# Patient Record
Sex: MALE | Race: WHITE | NOT HISPANIC OR LATINO | Employment: OTHER | URBAN - METROPOLITAN AREA
[De-identification: names, ages, dates, MRNs, and addresses within clinical notes are randomized per-mention and may not be internally consistent; named-entity substitution may affect disease eponyms.]

---

## 2023-06-11 ENCOUNTER — HOSPITAL ENCOUNTER (INPATIENT)
Facility: HOSPITAL | Age: 58
LOS: 3 days | Discharge: OTHER INSTITUTION | End: 2023-06-14
Attending: EMERGENCY MEDICINE | Admitting: EMERGENCY MEDICINE
Payer: COMMERCIAL

## 2023-06-11 ENCOUNTER — HOSPITAL ENCOUNTER (EMERGENCY)
Facility: HOSPITAL | Age: 58
End: 2023-06-11
Attending: EMERGENCY MEDICINE

## 2023-06-11 VITALS
OXYGEN SATURATION: 99 % | RESPIRATION RATE: 16 BRPM | SYSTOLIC BLOOD PRESSURE: 164 MMHG | DIASTOLIC BLOOD PRESSURE: 98 MMHG | TEMPERATURE: 98.4 F | HEART RATE: 105 BPM | WEIGHT: 160 LBS

## 2023-06-11 DIAGNOSIS — F10.20 ALCOHOL USE DISORDER, SEVERE, DEPENDENCE (HCC): ICD-10-CM

## 2023-06-11 DIAGNOSIS — F10.10 ALCOHOL ABUSE: Primary | ICD-10-CM

## 2023-06-11 PROBLEM — F10.939 ALCOHOL WITHDRAWAL SYNDROME WITH COMPLICATION (HCC): Status: ACTIVE | Noted: 2023-06-11

## 2023-06-11 PROBLEM — D75.89 MACROCYTOSIS WITHOUT ANEMIA: Status: ACTIVE | Noted: 2023-06-11

## 2023-06-11 LAB
ALBUMIN SERPL BCP-MCNC: 3.7 G/DL (ref 3.5–5)
ALP SERPL-CCNC: 104 U/L (ref 34–104)
ALT SERPL W P-5'-P-CCNC: 30 U/L (ref 7–52)
ANION GAP SERPL CALCULATED.3IONS-SCNC: 13 MMOL/L (ref 4–13)
APTT PPP: 28 SECONDS (ref 23–37)
AST SERPL W P-5'-P-CCNC: 53 U/L (ref 13–39)
BASE EXCESS BLDA CALC-SCNC: 1 MMOL/L (ref -2–3)
BASOPHILS # BLD AUTO: 0.03 THOUSANDS/ÂΜL (ref 0–0.1)
BASOPHILS NFR BLD AUTO: 1 % (ref 0–1)
BILIRUB SERPL-MCNC: 0.94 MG/DL (ref 0.2–1)
BUN SERPL-MCNC: 5 MG/DL (ref 5–25)
CA-I BLD-SCNC: 1.07 MMOL/L (ref 1.12–1.32)
CALCIUM SERPL-MCNC: 9.3 MG/DL (ref 8.4–10.2)
CHLORIDE SERPL-SCNC: 98 MMOL/L (ref 96–108)
CO2 SERPL-SCNC: 25 MMOL/L (ref 21–32)
CREAT SERPL-MCNC: 0.79 MG/DL (ref 0.6–1.3)
EOSINOPHIL # BLD AUTO: 0.05 THOUSAND/ÂΜL (ref 0–0.61)
EOSINOPHIL NFR BLD AUTO: 1 % (ref 0–6)
ERYTHROCYTE [DISTWIDTH] IN BLOOD BY AUTOMATED COUNT: 11.2 % (ref 11.6–15.1)
ETHANOL SERPL-MCNC: 198 MG/DL
GFR SERPL CREATININE-BSD FRML MDRD: 99 ML/MIN/1.73SQ M
GLUCOSE SERPL-MCNC: 100 MG/DL (ref 65–140)
GLUCOSE SERPL-MCNC: 97 MG/DL (ref 65–140)
HCO3 BLDA-SCNC: 24.7 MMOL/L (ref 24–30)
HCT VFR BLD AUTO: 43.5 % (ref 36.5–49.3)
HCT VFR BLD CALC: 43 % (ref 36.5–49.3)
HGB BLD-MCNC: 14.8 G/DL (ref 12–17)
HGB BLDA-MCNC: 14.6 G/DL (ref 12–17)
IMM GRANULOCYTES # BLD AUTO: 0.01 THOUSAND/UL (ref 0–0.2)
IMM GRANULOCYTES NFR BLD AUTO: 0 % (ref 0–2)
INR PPP: 0.94 (ref 0.84–1.19)
LYMPHOCYTES # BLD AUTO: 1.35 THOUSANDS/ÂΜL (ref 0.6–4.47)
LYMPHOCYTES NFR BLD AUTO: 25 % (ref 14–44)
MCH RBC QN AUTO: 33.9 PG (ref 26.8–34.3)
MCHC RBC AUTO-ENTMCNC: 34 G/DL (ref 31.4–37.4)
MCV RBC AUTO: 100 FL (ref 82–98)
MONOCYTES # BLD AUTO: 0.57 THOUSAND/ÂΜL (ref 0.17–1.22)
MONOCYTES NFR BLD AUTO: 11 % (ref 4–12)
NEUTROPHILS # BLD AUTO: 3.42 THOUSANDS/ÂΜL (ref 1.85–7.62)
NEUTS SEG NFR BLD AUTO: 62 % (ref 43–75)
NRBC BLD AUTO-RTO: 0 /100 WBCS
PCO2 BLD: 26 MMOL/L (ref 21–32)
PCO2 BLD: 36.3 MM HG (ref 42–50)
PH BLD: 7.44 [PH] (ref 7.3–7.4)
PLATELET # BLD AUTO: 165 THOUSANDS/UL (ref 149–390)
PMV BLD AUTO: 8.7 FL (ref 8.9–12.7)
PO2 BLD: 45 MM HG (ref 35–45)
POTASSIUM BLD-SCNC: 3.8 MMOL/L (ref 3.5–5.3)
POTASSIUM SERPL-SCNC: 3.8 MMOL/L (ref 3.5–5.3)
PROT SERPL-MCNC: 7.6 G/DL (ref 6.4–8.4)
PROTHROMBIN TIME: 13.2 SECONDS (ref 11.6–14.5)
RBC # BLD AUTO: 4.36 MILLION/UL (ref 3.88–5.62)
SAO2 % BLD FROM PO2: 83 % (ref 60–85)
SODIUM BLD-SCNC: 136 MMOL/L (ref 136–145)
SODIUM SERPL-SCNC: 136 MMOL/L (ref 135–147)
SPECIMEN SOURCE: ABNORMAL
WBC # BLD AUTO: 5.43 THOUSAND/UL (ref 4.31–10.16)

## 2023-06-11 PROCEDURE — 84295 ASSAY OF SERUM SODIUM: CPT

## 2023-06-11 PROCEDURE — 85025 COMPLETE CBC W/AUTO DIFF WBC: CPT | Performed by: EMERGENCY MEDICINE

## 2023-06-11 PROCEDURE — 80053 COMPREHEN METABOLIC PANEL: CPT | Performed by: EMERGENCY MEDICINE

## 2023-06-11 PROCEDURE — 99285 EMERGENCY DEPT VISIT HI MDM: CPT

## 2023-06-11 PROCEDURE — 82947 ASSAY GLUCOSE BLOOD QUANT: CPT

## 2023-06-11 PROCEDURE — 85610 PROTHROMBIN TIME: CPT | Performed by: EMERGENCY MEDICINE

## 2023-06-11 PROCEDURE — 84132 ASSAY OF SERUM POTASSIUM: CPT

## 2023-06-11 PROCEDURE — 85730 THROMBOPLASTIN TIME PARTIAL: CPT | Performed by: EMERGENCY MEDICINE

## 2023-06-11 PROCEDURE — 96360 HYDRATION IV INFUSION INIT: CPT

## 2023-06-11 PROCEDURE — 82077 ASSAY SPEC XCP UR&BREATH IA: CPT | Performed by: EMERGENCY MEDICINE

## 2023-06-11 PROCEDURE — 82803 BLOOD GASES ANY COMBINATION: CPT

## 2023-06-11 PROCEDURE — HZ2ZZZZ DETOXIFICATION SERVICES FOR SUBSTANCE ABUSE TREATMENT: ICD-10-PCS | Performed by: EMERGENCY MEDICINE

## 2023-06-11 PROCEDURE — 36415 COLL VENOUS BLD VENIPUNCTURE: CPT | Performed by: EMERGENCY MEDICINE

## 2023-06-11 PROCEDURE — 96361 HYDRATE IV INFUSION ADD-ON: CPT

## 2023-06-11 PROCEDURE — 85014 HEMATOCRIT: CPT

## 2023-06-11 PROCEDURE — 82330 ASSAY OF CALCIUM: CPT

## 2023-06-11 RX ORDER — TRAZODONE HYDROCHLORIDE 50 MG/1
50 TABLET ORAL
Status: DISCONTINUED | OUTPATIENT
Start: 2023-06-11 | End: 2023-06-14 | Stop reason: HOSPADM

## 2023-06-11 RX ORDER — SODIUM CHLORIDE 9 MG/ML
75 INJECTION, SOLUTION INTRAVENOUS CONTINUOUS
Status: DISCONTINUED | OUTPATIENT
Start: 2023-06-11 | End: 2023-06-13

## 2023-06-11 RX ORDER — ACETAMINOPHEN 325 MG/1
650 TABLET ORAL EVERY 6 HOURS PRN
Status: CANCELLED | OUTPATIENT
Start: 2023-06-11

## 2023-06-11 RX ORDER — LANOLIN ALCOHOL/MO/W.PET/CERES
100 CREAM (GRAM) TOPICAL DAILY
Status: CANCELLED | OUTPATIENT
Start: 2023-06-12

## 2023-06-11 RX ORDER — ACETAMINOPHEN 325 MG/1
650 TABLET ORAL EVERY 6 HOURS PRN
Status: DISCONTINUED | OUTPATIENT
Start: 2023-06-11 | End: 2023-06-14 | Stop reason: HOSPADM

## 2023-06-11 RX ORDER — ENOXAPARIN SODIUM 100 MG/ML
40 INJECTION SUBCUTANEOUS DAILY
Status: CANCELLED | OUTPATIENT
Start: 2023-06-12

## 2023-06-11 RX ORDER — FOLIC ACID 1 MG/1
1 TABLET ORAL DAILY
Status: DISCONTINUED | OUTPATIENT
Start: 2023-06-12 | End: 2023-06-14 | Stop reason: HOSPADM

## 2023-06-11 RX ORDER — FOLIC ACID 1 MG/1
1 TABLET ORAL DAILY
Status: CANCELLED | OUTPATIENT
Start: 2023-06-12

## 2023-06-11 RX ORDER — LANOLIN ALCOHOL/MO/W.PET/CERES
100 CREAM (GRAM) TOPICAL DAILY
Status: DISCONTINUED | OUTPATIENT
Start: 2023-06-12 | End: 2023-06-14 | Stop reason: HOSPADM

## 2023-06-11 RX ORDER — ENOXAPARIN SODIUM 100 MG/ML
40 INJECTION SUBCUTANEOUS DAILY
Status: DISCONTINUED | OUTPATIENT
Start: 2023-06-12 | End: 2023-06-14 | Stop reason: HOSPADM

## 2023-06-11 RX ORDER — ONDANSETRON 2 MG/ML
4 INJECTION INTRAMUSCULAR; INTRAVENOUS EVERY 6 HOURS PRN
Status: CANCELLED | OUTPATIENT
Start: 2023-06-11

## 2023-06-11 RX ORDER — TRAZODONE HYDROCHLORIDE 50 MG/1
50 TABLET ORAL
Status: CANCELLED | OUTPATIENT
Start: 2023-06-11

## 2023-06-11 RX ORDER — ONDANSETRON 2 MG/ML
4 INJECTION INTRAMUSCULAR; INTRAVENOUS EVERY 6 HOURS PRN
Status: DISCONTINUED | OUTPATIENT
Start: 2023-06-11 | End: 2023-06-14 | Stop reason: HOSPADM

## 2023-06-11 RX ORDER — SODIUM CHLORIDE 9 MG/ML
75 INJECTION, SOLUTION INTRAVENOUS CONTINUOUS
Status: CANCELLED | OUTPATIENT
Start: 2023-06-11

## 2023-06-11 RX ADMIN — SODIUM CHLORIDE 1000 ML: 0.9 INJECTION, SOLUTION INTRAVENOUS at 19:15

## 2023-06-12 PROBLEM — G62.9 PERIPHERAL NEUROPATHY: Status: ACTIVE | Noted: 2023-06-12

## 2023-06-12 PROBLEM — H91.90 HEARING IMPAIRED: Status: ACTIVE | Noted: 2023-06-12

## 2023-06-12 LAB
ALBUMIN SERPL BCP-MCNC: 3.5 G/DL (ref 3.5–5)
ALP SERPL-CCNC: 94 U/L (ref 34–104)
ALT SERPL W P-5'-P-CCNC: 26 U/L (ref 7–52)
ANION GAP SERPL CALCULATED.3IONS-SCNC: 13 MMOL/L (ref 4–13)
AST SERPL W P-5'-P-CCNC: 44 U/L (ref 13–39)
ATRIAL RATE: 88 BPM
BILIRUB SERPL-MCNC: 1.06 MG/DL (ref 0.2–1)
BUN SERPL-MCNC: 5 MG/DL (ref 5–25)
CALCIUM SERPL-MCNC: 8.6 MG/DL (ref 8.4–10.2)
CHLORIDE SERPL-SCNC: 102 MMOL/L (ref 96–108)
CO2 SERPL-SCNC: 23 MMOL/L (ref 21–32)
CREAT SERPL-MCNC: 0.76 MG/DL (ref 0.6–1.3)
ERYTHROCYTE [DISTWIDTH] IN BLOOD BY AUTOMATED COUNT: 11.3 % (ref 11.6–15.1)
GFR SERPL CREATININE-BSD FRML MDRD: 101 ML/MIN/1.73SQ M
GLUCOSE SERPL-MCNC: 81 MG/DL (ref 65–140)
HCT VFR BLD AUTO: 40.6 % (ref 36.5–49.3)
HGB BLD-MCNC: 13.8 G/DL (ref 12–17)
MAGNESIUM SERPL-MCNC: 1.9 MG/DL (ref 1.9–2.7)
MCH RBC QN AUTO: 33.5 PG (ref 26.8–34.3)
MCHC RBC AUTO-ENTMCNC: 34 G/DL (ref 31.4–37.4)
MCV RBC AUTO: 99 FL (ref 82–98)
P AXIS: 44 DEGREES
PLATELET # BLD AUTO: 161 THOUSANDS/UL (ref 149–390)
PMV BLD AUTO: 8.8 FL (ref 8.9–12.7)
POTASSIUM SERPL-SCNC: 3.8 MMOL/L (ref 3.5–5.3)
PR INTERVAL: 204 MS
PROT SERPL-MCNC: 6.5 G/DL (ref 6.4–8.4)
QRS AXIS: 13 DEGREES
QRSD INTERVAL: 88 MS
QT INTERVAL: 394 MS
QTC INTERVAL: 476 MS
RBC # BLD AUTO: 4.12 MILLION/UL (ref 3.88–5.62)
SODIUM SERPL-SCNC: 138 MMOL/L (ref 135–147)
T WAVE AXIS: 43 DEGREES
VENTRICULAR RATE: 88 BPM
WBC # BLD AUTO: 5.75 THOUSAND/UL (ref 4.31–10.16)

## 2023-06-12 PROCEDURE — 99223 1ST HOSP IP/OBS HIGH 75: CPT

## 2023-06-12 PROCEDURE — 83735 ASSAY OF MAGNESIUM: CPT

## 2023-06-12 PROCEDURE — 85027 COMPLETE CBC AUTOMATED: CPT

## 2023-06-12 PROCEDURE — 80053 COMPREHEN METABOLIC PANEL: CPT

## 2023-06-12 PROCEDURE — 93005 ELECTROCARDIOGRAM TRACING: CPT

## 2023-06-12 PROCEDURE — 93010 ELECTROCARDIOGRAM REPORT: CPT | Performed by: INTERNAL MEDICINE

## 2023-06-12 RX ORDER — PHENOBARBITAL 64.8 MG/1
64.8 TABLET ORAL ONCE
Status: COMPLETED | OUTPATIENT
Start: 2023-06-12 | End: 2023-06-12

## 2023-06-12 RX ORDER — PHENOBARBITAL SODIUM 130 MG/ML
130 INJECTION INTRAMUSCULAR ONCE
Status: COMPLETED | OUTPATIENT
Start: 2023-06-12 | End: 2023-06-12

## 2023-06-12 RX ORDER — PHENOBARBITAL SODIUM 130 MG/ML
260 INJECTION INTRAMUSCULAR ONCE
Status: COMPLETED | OUTPATIENT
Start: 2023-06-12 | End: 2023-06-12

## 2023-06-12 RX ADMIN — PHENOBARBITAL SODIUM 130 MG: 130 INJECTION INTRAMUSCULAR at 07:28

## 2023-06-12 RX ADMIN — SODIUM CHLORIDE 75 ML/HR: 0.9 INJECTION, SOLUTION INTRAVENOUS at 00:51

## 2023-06-12 RX ADMIN — PHENOBARBITAL 64.8 MG: 64.8 TABLET ORAL at 08:31

## 2023-06-12 RX ADMIN — PHENOBARBITAL 64.8 MG: 64.8 TABLET ORAL at 11:44

## 2023-06-12 RX ADMIN — PHENOBARBITAL SODIUM 130 MG: 130 INJECTION INTRAMUSCULAR at 14:04

## 2023-06-12 RX ADMIN — ENOXAPARIN SODIUM 40 MG: 40 INJECTION SUBCUTANEOUS at 08:29

## 2023-06-12 RX ADMIN — SODIUM CHLORIDE 75 ML/HR: 0.9 INJECTION, SOLUTION INTRAVENOUS at 23:55

## 2023-06-12 RX ADMIN — MULTIPLE VITAMINS W/ MINERALS TAB 1 TABLET: TAB ORAL at 08:29

## 2023-06-12 RX ADMIN — PHENOBARBITAL SODIUM 260 MG: 130 INJECTION INTRAMUSCULAR at 06:10

## 2023-06-12 RX ADMIN — THIAMINE HCL TAB 100 MG 100 MG: 100 TAB at 08:29

## 2023-06-12 RX ADMIN — FOLIC ACID 1 MG: 1 TABLET ORAL at 08:29

## 2023-06-12 RX ADMIN — PHENOBARBITAL SODIUM 130 MG: 130 INJECTION INTRAMUSCULAR at 09:55

## 2023-06-12 NOTE — PLAN OF CARE
Problem: PAIN - ADULT  Goal: Verbalizes/displays adequate comfort level or baseline comfort level  Description: Interventions:  - Encourage patient to monitor pain and request assistance  - Assess pain using appropriate pain scale  - Administer analgesics based on type and severity of pain and evaluate response  - Implement non-pharmacological measures as appropriate and evaluate response  - Consider cultural and social influences on pain and pain management  - Notify physician/advanced practitioner if interventions unsuccessful or patient reports new pain  Outcome: Not Progressing     Problem: INFECTION - ADULT  Goal: Absence or prevention of progression during hospitalization  Description: INTERVENTIONS:  - Assess and monitor for signs and symptoms of infection  - Monitor lab/diagnostic results  - Monitor all insertion sites, i e  indwelling lines, tubes, and drains  - Monitor endotracheal if appropriate and nasal secretions for changes in amount and color  - Scotts appropriate cooling/warming therapies per order  - Administer medications as ordered  - Instruct and encourage patient and family to use good hand hygiene technique  - Identify and instruct in appropriate isolation precautions for identified infection/condition  Outcome: Not Progressing  Goal: Absence of fever/infection during neutropenic period  Description: INTERVENTIONS:  - Monitor WBC    Outcome: Not Progressing     Problem: SAFETY ADULT  Goal: Patient will remain free of falls  Description: INTERVENTIONS:  - Educate patient/family on patient safety including physical limitations  - Instruct patient to call for assistance with activity   - Consult OT/PT to assist with strengthening/mobility   - Keep Call bell within reach  - Keep bed low and locked with side rails adjusted as appropriate  - Keep care items and personal belongings within reach  - Initiate and maintain comfort rounds  - Make Fall Risk Sign visible to staff  - Apply yellow socks and bracelet for high fall risk patients  - Consider moving patient to room near nurses station  Outcome: Not Progressing  Goal: Maintain or return to baseline ADL function  Description: INTERVENTIONS:  -  Assess patient's ability to carry out ADLs; assess patient's baseline for ADL function and identify physical deficits which impact ability to perform ADLs (bathing, care of mouth/teeth, toileting, grooming, dressing, etc )  - Assess/evaluate cause of self-care deficits   - Assess range of motion  - Assess patient's mobility; develop plan if impaired  - Assess patient's need for assistive devices and provide as appropriate  - Encourage maximum independence but intervene and supervise when necessary  - Involve family in performance of ADLs  - Assess for home care needs following discharge   - Consider OT consult to assist with ADL evaluation and planning for discharge  - Provide patient education as appropriate  Outcome: Not Progressing  Goal: Maintains/Returns to pre admission functional level  Description: INTERVENTIONS:  - Perform BMAT or MOVE assessment daily    - Set and communicate daily mobility goal to care team and patient/family/caregiver     - Collaborate with rehabilitation services on mobility goals if consulted  - Out of bed to chair 1 times a day   - Out of bed for meals 1 times a day  - Out of bed for toileting  - Record patient progress and toleration of activity level   Outcome: Not Progressing     Problem: DISCHARGE PLANNING  Goal: Discharge to home or other facility with appropriate resources  Description: INTERVENTIONS:  - Identify barriers to discharge w/patient and caregiver  - Arrange for needed discharge resources and transportation as appropriate  - Identify discharge learning needs (meds, wound care, etc )  - Arrange for interpretive services to assist at discharge as needed  - Refer to Case Management Department for coordinating discharge planning if the patient needs post-hospital services based on physician/advanced practitioner order or complex needs related to functional status, cognitive ability, or social support system  Outcome: Not Progressing     Problem: Knowledge Deficit  Goal: Patient/family/caregiver demonstrates understanding of disease process, treatment plan, medications, and discharge instructions  Description: Complete learning assessment and assess knowledge base    Interventions:  - Provide teaching at level of understanding  - Provide teaching via preferred learning methods  Outcome: Not Progressing

## 2023-06-12 NOTE — DISCHARGE INSTR - OTHER ORDERS
[facility] DRUG AND ALCOHOL RESOURCES    If you have health insurance, including medical assistance, there should be a phone number on your insurance card that you can call to find out how to access services  The card may say, “For [address] or “For Drug and Alcohol Services” or “For Substance Abuse Services” call the number provided  Need immediate help? Call [place] Get Help Now at [phone] or [address] at [phone]     24 hour hotline The [address]  [phone]    HOW DO I ACCESS TREATMENT? In response to the current guidelines of coronavirus (COVID-19), there have been some changes as to how treatment and services are accessed  The first step in accessing treatment is to have a drug and alcohol assessment to determine the type of treatment and recovery plan needed  Many of the assessments will occur via Telehealth  Staff at the assessment site will determine if emergent care needs - detox, psychiatric, prenatal - are necessary  Jim Styles [address] [phone]  [address] [place] [address], [address] [facility] (on the grounds of [facility])  Sukhjinder Arevalo 10 [phone] or [phone]  [address] [place] [address]  [address] [place] [address] ([facility]  Centers of Excellence coordinate care for people with Medicaid  Treatment is team-based and focused on the [facility] person,” with the explicit goal of integrating behavioral health with primary care  This approach includes psychiatric support, nursing, clinical care and a Certified  (Pio  98 )   The team helps individuals navigate and connect to resources that address physical healthcare, early recovery needs, drug and alcohol, and mental health needs and help develop a positive recovery environment that includes addressing housing needs, vocational needs and recovery support systems  [address] [place] [address]  [phone] [facility]  [address] focus on helping the community connect to resources that will help educate individuals and family members about substance use and recovery  They are open to the public at no cost  These Centers also connect individuals to a variety of supportive services that will help him or her achieve - and sustain - recovery  [address] [facility] [address]  [phone] [place],   [address]   [facility] [address] is located at [address] [place] [address]  [phone]   [address] provides recovery support programs and services for the entire recovery community, including people in recovery and their family members  Some of the FREE programs and services we offer through [facility]: recovery coaching & planning, educational programs, recovery skill-building programs, [employer], yoga, recovery Bible discussion, job training programs, self-love/self-esteem programs, resume help, mentor program for inmates, [employer] support, advocacy, and much more! Women's [address]   [address] [facility] [address]       [facility]   Duration:12 Hours   Repeat:Event first occured on 11- and repeats weekly on Wednesday,  another 35 times until 07-   Detail:The [place] of [address] [place] [address]  is excited to be able to provide an all-day recovery support chat group monitored by [employer]! Drop in, say hi, socialize! We are here to support you! Click the link to join the group chat: [email or web address]  Contact Phone:[phone]    [facility] Stress Off Your Chest   Duration:1 Hour   Repeat:Event first occured on 03- and repeats weekly on Wednesday,  another 18 times until 07-   Detail:A support group with a variety of educational tools to help assist with stress reduction and promote healthy coping techniques  Click the link to join the group chat: [email or web address]   Visit the [facility] (at limited capacity, with masks and social distancing required): [address], [place]    [facility]  Confidential free help, from [facility], to find substance use treatment and information  [phone]    12 step Meetings   Steph 77 meeting list can be found at [email or web address] and [email or web address]  [email or web address]  NA meeting list can be found at [email or web address]/    [address] [facility] are located throughout [facility] and provide a safe living environment for individuals seeking recovery and support  For more information, visit www Bihu.com  [facility]) has established the Former[facility] [place] Program to assist with funding up to 60 days of housing for individuals in need who are enrolled or eligible for [place]/Health Choices of [facility]  Visit www bcdac org or call [phone] for more information  Family Resources   Family Service association [address] BY ADDICTIONThe purpose of this support group is for participants to learn about addiction, engage and connect with others, raise awareness about substance use disorders and hear about vital community resources  The group meets at [place] [address]  of [facility]   Location: [facility], [address] [place] [address],4Th Floor (Room 135) Day/Time: Second Tuesday of every month from 6:30 PM to 8:00 PM Support group is free of charge  Registration is NOT required  QUESTIONS? Contact Kole Quick, Support Group Facilitator, at Kodak@[employer] or , ext  109  Contact Kole Quick Support Group Facilitator, at    [address] [place] [address]  A gathering of family members affected by their loved ones’ substance use  All are welcome, no matter if your loved one is in active addiction, engaged in treatment or in recovery  This group provides a safe place for family members to come together and offer suggestions, strength and hope  Wednesdays 6:30-8:30 at [facility]     Family Education Program  [de-identified] percent of American lives are impacted by the addiction of a family member  When someone is addicted to drugs or alcohol, the disease affects the entire family  They need information and support and the [facility] is a place for them to turn  The program helps individuals and families recognize and address addiction problems in a spouse, parent, child or a loved one  Facilitated by trained volunteers who have been on the same journey, this information and education-support program gives the participants the tools they need to address addiction in the family constructively and begin their own process of healing and recovery  The program includes 3 two-hour sessions spread over three weeks and covers the following topics, including ample time for discussion: There is no fee to attend, but pre-registration is required  To register call weekdays 9am through [facility],[address] Floor, [phone]  Choosing a primary care provider    A primary care provider (PCP) is a health care practitioner who sees people that have common medical problems  This person is most often a doctor  However, a PCP may be a physician assistant or a nurse practitioner  Your PCP is often involved in your care for a long time  Therefore, it is important to choose someone with whom you will work well      Information  A PCP is your main health care provider in non-emergency situations  Your PCP's role is to:    Provide preventive care and teach healthy lifestyle choices  Identify and treat common medical conditions  Assess the urgency of your medical problems and direct you to the best place for that care  Make referrals to medical specialists when necessary  Primary care is most often provided in an outpatient setting  However, if you are admitted to the hospital, your PCP may assist in or direct your care, depending on the circumstances  Having a PCP can give you a trusting, ongoing relationship with one medical professional over time  You can choose from several different types of PCPs:    Family practitioners: Doctors who have completed a family practice residency and are board-certified, or board-eligible, for this specialty  The scope of their practice includes children and adults of all ages and may include obstetrics and minor surgery  Pediatricians: Doctors who have completed a pediatric residency and are board-certified, or board-eligible, in this specialty  The scope of their practice includes the care of newborns, infants, children, and adolescents  Geriatricians: Doctors who have completed a residency in either family medicine or internal medicine and are board-certified in this specialty  They often serve as a PCP for older adults with complex medical needs related to aging  Internists: Doctors who have completed a residency in internal medicine and are board-certified, or board-eligible, in this specialty  The scope of their practice includes the care of adults of all ages for many different medical problems  Obstetricians/gynecologists: Doctors who have completed a residency and are board-certified, or board-eligible, in this specialty  They often serve as a PCP for women, particularly those of childbearing age  Nurse practitioners (NP) and physician assistants (PA): Practitioners who go through a different training and certification process than doctors   They "may be your PCP in some practices  Many insurance plans limit the providers you can choose from, or provide financial incentives for you to select from a specific list of providers  Make sure you know what your insurance covers before starting to narrow down your options  When choosing a PCP, also consider the following:    Is the office staff friendly and helpful? Is the office good about returning calls? Are the office hours convenient to your schedule? How easy is it to reach the provider? Does the provider use email? Do you prefer a provider whose communication style is friendly and warm, or more formal?  Do you prefer a provider focused on disease treatment, or wellness and prevention? Does the provider have a conservative or aggressive approach to treatment? Does the provider order a lot of tests? Does the provider refer to other specialists frequently or infrequently? What do colleagues and patients say about the provider? Does the provider invite you to be involved in your care? Does the provider view your patient-doctor relationship as a true partnership? You can get referrals from:    Friends, neighbors, or relatives  State-level medical associations, nursing associations, and associations for physician assistants  Your dentist, pharmacist, optometrist, previous provider, or other health professional  Advocacy groups may be especially helpful to find the best provider for a specific chronic condition or disability  Many health plans, such as HMOs or PPOs, have websites, directories, or customer service staff who can help you select a PCP who is right for you  Another option is to request an appointment to \"interview\" a potential provider  There may be no cost to do this, or you may be charged a co-payment or other small fee   Some practices, particularly pediatric practice groups, may have an open house where you have an opportunity to meet several of the providers in that particular " group     If a health care problem comes up and you do not have a primary health care provider, in most cases, it is best to seek non-emergency care from an urgent care center rather than a hospital emergency room  This will often save you time and money  In recent years, many emergency rooms have expanded their services to include urgent care within the emergency room itself or an adjoining area   To find out, call the hospital first

## 2023-06-12 NOTE — PROGRESS NOTES
06/12/23 1340   Referral Data   Referral Source Patient   Referral Name [facility]   Referral Reason Drug/Alcohol [place]   Readmission Root Cause   30 Day Readmission No   Patient Information   Mental Status Alert   Primary Caregiver Self   Support System Immediate family;Friends   Voodoo/Cultural Requests n/a   Legal Information   Legal Issues pt denies   Activities of Daily Living Prior to Admission   Functional Status Independent   Assistive Device No device needed   Living Arrangement Homeless   Ambulation Independent   Access to Firearms   Access to Firearms No  (pt denies)   [address] SSI/SSD  ($1600/monthly)   Means of Transportation   Means of Transport to Appts: Drives Self        43/69/99 1343   Substance Abuse Addendum Details   History of Withdrawal Symptoms Other withdrawal symptoms (specify in comment)  (tremors)   Medical Complications hearing impaired, peripheral neuropathy   Sober Supports friends   Present Treatment detox   Substance Abuse Treatment Hx Denies past history   Stage of Change   Stage of Change Precontemplation     Additional Substance Use Detail    Questions Responses   Problems Due to Past Use of Alcohol? Yes   Problems Due to Past Use of Substances? No   Substance Use Assessment Denies substance use within the past 12 months   Alcohol Use Frequency Daily   Alcohol Drink of Choice beer   1st Use of Alcohol 4   Last Use of Alcohol & Amount 6/11/2023 4  12 oz beers   Longest Abstinence from Alcohol 1 month     Pt is a 62year old male who was admitted to withdrawal management unit for alcohol withdrawal   Pt had presented at [facility] ED with a friend requesting detox  Pt's name, date of birth, home address, and telephone number were verified  Pt was informed of case management role and the purpose of the completion of intake with case management    Pt reports he does not have stable housing but can reside with a friend when discharged  Pt presented as cooperative during intake  Pt reports he had been drinking for the last 30 years with very little abstinence  Pt reports he is currently drinking about a 6 pack of 12 oz beers daily but had previously been drinking vodka about 3 months ago daily  Pt reports first use at age 3 and last use on 6/11/2023 of 4 12 oz beers  Pt reports longest abstinence of 1 month about 10 years ago  Pt denied any ALYSSIA treatment in the past and reports he attended 1 12 step meeting in his life  Pt reports current and only withdrawal symptom of tremors  Pt denied any hx of other withdrawal symptoms or seizures, hallucinations, or Dt's  Pt denied black out alcohol use  Pt denied a family hx of Alcohol/Substance use needs  AUDIT: no score    PAWSS: 1  Ethanol in ED: 198  UDS: not completed    Pt denied any hx of mental health diagnosis or treatment  Pt denied any SI or HI, denied any AH/VH, denied any hx of abuse or trauma, denied any recent losses, denied any access to firearms, denied any family hx of mental health needs  Pt has current medical conditions of hearing loss and peripheral neuropathy  Pt reports the occasional use of a straight cane  Pt denied having a PCP and states he is uncertain if he has [place] insurance  Cm had pt sign an KRISSY for medical assistance and contacted PATHS regarding pt's possible insurance needs  Pt reports a preferred pharmacy of [facility] in [facility]  Pt denied any legal issues  Pt reports her receives SSD in the amount of $1600/monthly  Pt reports he complete high school  Pt reports he has a car and a license  Pt denied  service and denied any religous or cultural request     Pt reports he has no current address but a friend has stated he could reside with them at [address] [place] [address]  Pt denied any housing or food insecurities at this time   Pt signed an KRISSY for Dipika Delgado, friend, and she was contacted at [phone]  Cm provided an update and a discussion was had regarding discharge planning  Pt and Cm completed relapse prevention plan  Pt and Cm signed plan and pt received a copy of this plan  Pt struggled with completing this plan, stating he was uncertain if he wanted to stop or cut back  Pt reports this admission was precipitated by an intervention from family and friends and he had agreed to detox and rehab  Pt stated he was interested in rehab and signed an KRISSY for [facility]  Cm explained to pt the difficulties with admission to rehab due to pt's possible lack of health insurance but agreed to reach out to [facility] to discuss  Cm contacted [facility] and requested pt receive assistance with Andrew and possible admission  CM was informed by [facility] this would be evaluated and cm would receive contact in return regarding this request  Pt's clinical presentation indicates pt struggles with insight into his use and the difficulties with controlled drinking  Pt's barriers present as unstable housing and lack of health insurance  Pt's goals for detox are to successfully complete medical withdrawal and to transfer to inpatient ALYSSIA rehab  Pt presents in the pre-contemplation stage of change

## 2023-06-12 NOTE — ASSESSMENT & PLAN NOTE
Recent Labs     06/11/23  1913 06/11/23  1924   HGB 14 8 14 6   *  --      · In the setting of chronic alcohol use   · Vitamin supplementation as above   · Continue to monitor  · Encourage alcohol cessation

## 2023-06-12 NOTE — ASSESSMENT & PLAN NOTE
· Patient reports daily alcohol use   · Last drink the evening of 6/11  · EtOH 198 on initial labs   · Patient denies history of withdrawal seizures   · Patient currently endorses no withdrawal symptoms   · Patient initiated on SEWS protocol for symptom triggered phenobarbital therapy   · Total Pheno 780mg with improvement of symptoms  · SEWs protocol discontinued 06/13   · Continue Supplementation with MVI, Thiamine and folic acid   · Electrolyte repletion as needed  · Pulse oximetry and telemetry monitoring

## 2023-06-12 NOTE — H&P
HISTORY & PHYSICAL EXAM  DEPARTMENT OF MEDICAL TOXICOLOGY  LEVEL 4 MEDICAL DETOX UNIT  Matias Herrera 62 y o  male MRN: [id number]  Unit/Bed#: 5T [facility] 514-01 Encounter: [id number]      Reason for Admission/Principal Problem: Ethanol withdrawal, Ethanol use disorder  Admitting Provider: Dimple Ríos PA-C  Attending Provider: Elizabeth Valentine MD   6/11/2023 11:05 PM        * Alcohol withdrawal syndrome with complication [facility])  Assessment & Plan  · Patient reports daily alcohol use   · Last drink the evening of 6/11  · EtOH 198 on initial labs   · Patient denies history of withdrawal seizures   · Patient currently endorses no withdrawal symptoms     · [facility] for symptom triggered phenobarbital therapy   · Initial dose of phenobarbital held due to lack of withdrawal symptoms   · Symptomatic and supportive management   · Thiamine and folic acid supplementation  · Electrolyte repletion as needed  · Pulse oximetry and telemetry monitoring        Alcohol use disorder, severe, dependence ([facility] [address] )  Assessment & Plan  · Patient reports daily alcohol use, consuming 4-6 beers daily   · Last drink the evening of 6/11  · Has never been in treatment for alcohol use before    · Case management for assistance in discharge planning   · Patient potentially interested inpatient rehab, currently contemplating   · See Alcohol Withdrawal      Hearing impaired  Assessment & Plan  · 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 ears which improves his hearing  · Does not have hearing aids or cochlear implants  · Patient best able to hear when standing directly in front of him, uses lipreading to supplement hearing    · Referral for ENT at time of discharge   · His previous provider retired and he has not reestablished with a new one     Peripheral neuropathy  Assessment & Plan  · History of peripheral neuropathy in bilateral lower extremities  · Uses a cane at baseline  · Not on any current medications, patient not interested in being started on medication    · Continue using cane with ambulation  · PT consult- recommendations appreciated    Macrocytosis without anemia  Assessment & Plan  Recent Labs     06/11/23  1913 06/11/23  1924   HGB 14 8 14 6   *  --      · In the setting of chronic alcohol use   · Vitamin supplementation as above   · Continue to monitor  · Encourage cessation             VTE Prophylaxis: Enoxaparin (Lovenox)  / sequential compression device   Code Status: Full code       Anticipated Length of Stay:  Patient will be admitted on an Inpatient basis with an anticipated length of stay of  2-3  midnights  Justification for Hospital Stay: Alcohol withdrawal     For any questions or concerns, please Tiger Text the advanced practitioner in the role of [facility] On Call      This patient qualifies for Level IV medically managed intensive inpatient services under the criteria set by the American Society of Addiction Medicine, including dimensions 1-3  The patient is in withdrawal (or is intoxicated with high risk of withdrawal), with severe and unstable medical and/or psychiatric (dual diagnosis) problems, requiring requires 24-hour medical and nursing care and the full resources of a [address] patient to medical detox unit and continue supportive care and stabilization of acute ethanol withdrawal per medical toxicology/detox treatment pathway  Monitor ethanol withdrawal severity via the Severity of Ethanol Withdrawal Scale (SEWS) Q4 hours and then hourly if/when SEWS > 6  Treat withdrawal per pathway and reassess Q30-60 minutes             Mild SEWS Score 1-6  Administer medications* (IV or PO; PO preferred):  • If initial SEWS score: diazepam 10mg PO/IV x 1 AND phenobarbital 65 mg PO/IV x 1  • If repeat SEWS score 1-6: phenobarbital 65 mg PO/IV q1 hour x 5 doses maximum   Reassessment:   • SEWS q1 hour after each dose until SEWS 0 x 2 hours  • VS q1 hours (until SEWS 0, then q4 hours)  • Notify provider for bedside evaluation if 5-dose maximum is reached, RASS of -3 to -5, or SEWS score escalates to moderate or severe  Moderate SEWS Score 7-12  Administer medications* (IV):  • If initial SEWS score: diazepam 10mg IV x 1 AND phenobarbital 260 mg IV x 1  • If repeat SEWS score 7-12 or score escalated from mild: phenobarbital 130 mg IV q30 minutes x 5 doses maximum   Reassessment:  • SEWS q30 minutes after each dose until SEWS < 7 (then hourly until SEWS 0 x 2 hours)  • VS q30 minutes until SEWS < 7 (then hourly until SEWS 0, then q4 hours)  • Notify provider for bedside evaluation if 5-dose maximum is reached, RASS of -3 to -5, or SEWS score escalates to severe  Severe SEWS Score ? 13  Administer medications* (IV):  • If initial SEWS score: Diazepam 10 mg IV x 1 AND phenobarbital 650 mg IV piggyback x 1 over 15-30 minutes  • If repeat SEWS score ? 13 or score escalated from mild or moderate: phenobarbital 130 mg IV q30 minutes x 5 doses maximum   Reassessment:  • SEWS q30 minutes after each dose until SEWS < 7 (then hourly until SEWS 0 x 2 hours)   • VS q30 minutes until SEWS < 7 (then hourly until SEWS 0, then q4 hours)  • Notify provider for bedside evaluation if 5-dose maximum is reached or RASS of -3 to -5   *Hold medications and notify provider if CNS depression, respirations < 10/min, or RASS of -3 to -5           Medications to be administered adjunctively if more than 2 grams of phenobarbital is needed for stabilization of withdrawal; require attending approval    • Dexmedetomidine infusion 0 1-1mcg/kg/hr IV infusion, titratable to reduced agitation (Goal: RASS -2)  • Ketamine   o Acute agitated delirium: 1-2 mg/kg IV or 4-5 mg/kg IM  o Refractory withdrawal: 0 1-1mg/kg/hr IV infusion, titratable to reduced agitation (Goal: RASS -2)    Further evaluation, screening and treatment:  Evaluate complete metabolic panel, transaminases, INR, and lipase  Assess hepatic ultrasound for any sign of alcoholic liver disease or cirrhosis, and ultimately refer for further hepatic evaluation and care as/if indicated  Additional medications for ethanol associated malnutrition: Thiamine 100 mg IV daily, increase to 500 mg TID for signs/symptoms of Wernicke's Encephalopathy or Wernicke Korsakoff Syndrome   Folic acid 1 mg IV daily   Multivitamin PO daily      Will offer first monthly injection of Naltrexone 380 mg IM, once patient is stabilized, as it has been shown to assist in decreasing cravings for ethanol  Evaluate and treat for coexisting substance use, such as opioids and nicotine  Discuss risk factors for infectious disease, such as history of intravenous drug abuse, and offer hepatitis and HIV screening if indicated  Case management consultation to assist with coordination of subsequent treatment after discharge  Hx and PE limited by: None, patient alert and cooperative     HPI: Sunshine Nichole is a 62y o  year old male with PMHx of hearing impairment and peripheral neuropathy, who presented to the [facility] ED requesting detoxification from alcohol  Patient reports drinking a six-pack of beer daily with last drink the evening of 6/11  EtOH 198 on initial labs  Patient was admitted to [address] [place] [address] detox unit for alcohol detoxification  He was started on SEWS protocol with symptom triggered phenobarbital along with symptomatic management of withdrawal  Patient received an initial dose of phenobarbital held due to lack of withdrawal symptoms  Patient currently denying any withdrawal symptoms, stating he is at his baseline        Preferred alcoholic beverage(s): Beer  Quantity and frequency of alcohol intake: 6 pack daily   Use of any ethanol substitutes (toxic alcohols): no  Date/Time of last alcohol intake: Evening of 6/11  Current signs and symptoms of ethanol withdrawal: denies    SEWS Total Score: 0 (6/11/2023 11:35 PM)      Ethanol Withdrawal History  Previous ethanol withdrawal? no  Prior inpatient treatment for ethanol withdrawal? no  Prior outpatient treatment for ethanol withdrawal? no  History of seizures with prior ethanol withdrawal? no  Prior treatment with naltrexone (Vivitrol)? no  Current treatment with naltrexone (Vivitrol)? no  Other current treatment for ethanol use disorder? no  Co-existing substance use? no    Review of PDMP: yes     Social History     Substance and Sexual Activity   Alcohol Use Yes   • Alcohol/week: 4 0 standard drinks of alcohol   • Types: 4 Cans of beer per week     Social History     Substance and Sexual Activity   Drug Use Never     Social History     Tobacco Use   Smoking Status Never   Smokeless Tobacco Never       Review of Systems   Constitutional: Negative for chills, diaphoresis and fever  HENT: Negative for congestion and rhinorrhea  Respiratory: Negative for cough, chest tightness and shortness of breath  Cardiovascular: Negative for chest pain and palpitations  Gastrointestinal: Negative for abdominal pain, constipation, diarrhea, nausea and vomiting  Genitourinary: Negative for difficulty urinating  Musculoskeletal: Negative for arthralgias and gait problem  Neurological: Negative for tremors, seizures, syncope and headaches  Psychiatric/Behavioral: Negative for agitation, hallucinations, self-injury and suicidal ideas  The patient is not nervous/anxious  Historical Information   No past medical history on file  No past surgical history on file  No family history on file    Social History   Marital Status:    Occupation: Disability, part time heavy    Patient Pre-hospital Living Situation: Stable, lives alone  Patient Pre-hospital Level of Mobility: Uses a cane   Patient Pre-hospital Diet Restrictions: None    Allergies   Allergen Reactions   • Penicillins Hives   • Sulfa Antibiotics Other (See Comments)     hives Prior to Admission medications    Not on File       Current Facility-Administered Medications   Medication Dose Route Frequency   • acetaminophen (TYLENOL) tablet 650 mg  650 mg Oral Q6H PRN   • enoxaparin (LOVENOX) subcutaneous injection 40 mg  40 mg Subcutaneous Daily   • folic acid (FOLVITE) tablet 1 mg  1 mg Oral Daily   • multivitamin-minerals (CENTRUM) tablet 1 tablet  1 tablet Oral Daily   • ondansetron (ZOFRAN) injection 4 mg  4 mg Intravenous Q6H PRN   • sodium chloride 0 9 % infusion  75 mL/hr Intravenous Continuous   • thiamine tablet 100 mg  100 mg Oral Daily   • traZODone (DESYREL) tablet 50 mg  50 mg Oral HS PRN       Continuous Infusions:sodium chloride, 75 mL/hr             Objective     No intake or output data in the 24 hours ending 06/12/23 0026    Invasive Devices:   Peripheral IV 06/11/23 Right;Ventral (anterior) Forearm (Active)       Vitals   Vitals:    06/11/23 2326   BP: 139/93   TempSrc: Temporal   Pulse: 92   Resp: 18   Patient Position - Orthostatic VS: Sitting   Temp: 98 7 °F (37 1 °C)       Physical Exam  Constitutional:       General: He is not in acute distress  Appearance: He is not diaphoretic  Eyes:      Pupils: Pupils are equal, round, and reactive to light  Cardiovascular:      Rate and Rhythm: Normal rate and regular rhythm  Pulses: Normal pulses  Heart sounds: Normal heart sounds  No murmur heard  No gallop  Pulmonary:      Effort: Pulmonary effort is normal  No respiratory distress  Breath sounds: Normal breath sounds  No wheezing or rales  Abdominal:      General: Abdomen is flat  Bowel sounds are normal  There is no distension  Palpations: Abdomen is soft  Tenderness: There is no abdominal tenderness  There is no guarding  Musculoskeletal:         General: Normal range of motion  Skin:     General: Skin is warm and dry  Capillary Refill: Capillary refill takes less than 2 seconds     Neurological:      Mental Status: He "is alert and oriented to person, place, and time  Motor: Tremor present  Coordination: Finger-Nose-Finger Test normal       Comments: Slight hand tremor, patient reports as baseline    Psychiatric:         Attention and Perception: Attention normal          Mood and Affect: Mood and affect normal          Speech: Speech normal          Behavior: Behavior is cooperative  Thought Content: Thought content normal        Data:    EKG, Pathology, and Other Studies: I have personally reviewed pertinent reports  EKG 6/12/23 0104  Read 0111     Rate- 88 bpm  WA interval- 204 ms  QRS duration- 88 ms  QT/QTC- 394/476 ms     Interpretation-normal sinus rhythm    Normal EKG    Lab Results:  CBC ETOH     Lab Results   Component Value Date    HCT 43 06/11/2023    HCT 43 5 06/11/2023    HGB 14 6 06/11/2023    HGB 14 8 06/11/2023    MCH 33 9 06/11/2023    MCHC 34 0 06/11/2023     (H) 06/11/2023    MPV 8 7 (L) 06/11/2023     06/11/2023    RBC 4 36 06/11/2023    RDW 11 2 (L) 06/11/2023    WBC 5 43 06/11/2023      No results found for: \"LACTICACID\"   CMP UA         Component Value Date/Time    BUN 5 06/11/2023 1914    CL 98 06/11/2023 1914    CO2 26 06/11/2023 1924    CO2 25 06/11/2023 1914    CREATININE 0 79 06/11/2023 1914    K 3 8 06/11/2023 1914         Component Value Date/Time    ALKPHOS 104 06/11/2023 1914    ALT 30 06/11/2023 1914    AST 53 (H) 06/11/2023 1914    CALCIUM 9 3 06/11/2023 1914      No results found for: \"BACTERIA\", \"BILIRUBINUR\", \"BLOODU\", \"CLARITYU\", \"COLORU\", \"EPIS\", \"GLUCOSEU\", \"HYALINE\", \"KETONESU\", \"LEUKOCYTESUR\", \"NITRITE\", \"PHUR\", \"PROTEIN UA\", \"RBCUA\", \"SPECGRAV\", \"UROBILINOGEN\", \"WBCUA\"     Liver Function Test: ASA     Lab Results   Component Value Date    ALB 3 7 06/11/2023    ALKPHOS 104 06/11/2023    ALT 30 06/11/2023    AST 53 (H) 06/11/2023    TBILI 0 94 06/11/2023    TP 7 6 06/11/2023      No results found for: \"SALICYLATE\"   Troponin APAP     No results found for: " "\"TROPONINI\"   No results found for: \"ACTMNPHEN\"   VBG HCG     No results found for: \"BEVEN\", \"KXA6IJK\", \"X0LWYQGYH\", \"T8GHFOT\", \"ADU7IUH\", \"PHVEN\", \"PO2VEN\"   No results found for: \"HCGQUANT\"   ABG Urine Drug Screen     No results found for: \"ACRATE\", \"LILLY\", \"BEART\", \"DMA2NCM\", \"INSPIREDAIR\", \"L6QCKTQXS\", \"O2HGB\", \"OCC3FJX\", \"PEEP\", \"PHART\", \"PO2ART\", \"SOURC\", \"VTAC\"   No results found for: \"AMPMETHUR\", \"BARBTUR\", \"BDZUR\", \"COCAINEUR\", \"METHADONEUR\", \"OPIATEUR\", \"OXYCODONEUR\", \"PCPUR\", \"THCUR\"   Lactate INR     No results found for: \"LACTICACID\"   Lab Results   Component Value Date    INR 0 94 06/11/2023      PTT Protime     Lab Results   Component Value Date/Time    PTT 28 06/11/2023 07:13 PM        Lab Results   Component Value Date/Time    PROTIME 13 2 06/11/2023 07:13 PM              Imaging Studies: I have personally reviewed pertinent reports  Counseling / Coordination of Care  Total floor / unit time spent today 60 minutes  Greater than 50% of total time was spent with the patient and / or family counseling and / or coordination of care  ** Please Note: This note has been constructed using a voice recognition system   **    "

## 2023-06-12 NOTE — ASSESSMENT & PLAN NOTE
· History of peripheral neuropathy in bilateral lower extremities  · Uses a cane at baseline  · Not on any current medications, patient not interested in being started on medication  · Continue using cane with ambulation  · PT consult- recommendations appreciated  · Safe for home discharge with no assistive needs except for cane as needed

## 2023-06-12 NOTE — UTILIZATION REVIEW
Initial Clinical Review    Pt initially presented to [address]  [facility] ED  Pt was transferred by EMS to [address] [place] [address] for its Level IV medically managed intensive inpatient detox unit, not available at [facility]  Admission: Date/Time/Statement:   Admission Orders (From admission, onward)     Ordered        06/11/23 2323  Inpatient Admission  Once                      Orders Placed This Encounter   Procedures   • Inpatient Admission     Standing Status:   Standing     Number of Occurrences:   1     Order Specific Question:   Level of Care     Answer:   Med Surg [16]     Order Specific Question:   Estimated length of stay     Answer:   More than 2 Midnights     Order Specific Question:   Certification     Answer:   I certify that inpatient services are medically necessary for this patient for a duration of greater than two midnights  See H&P and MD Progress Notes for additional information about the patient's course of treatment  Initial Presentation: 62 y o  male who presented to medical detox  Inpatient admission for evaluation and treatment of alcohol withdrawal syndrome  Presented w/ need for detox from alcohol  Serum ETOH: 197  Reports six-pack beer daily, last drink on 6/11 evening  Has no prior rehab treatment for withdrawal  Reports no hx of withdrawal seizures  On exam, unremarkable  SEWS 0  Plan: SEWS monitoring w/ phenobarbital management, PO thiamine/folic acid supplement, IVF, telemetry, continuous pulse ox, continue PTA meds, trend labs, replete electrolytes as needed  Date: 06/12/23       Day 2: On exam, tremors, diaphoretic, tachycardic, HTN  SEWS 7  Plan: continue SEWS monitoring w/ phenobarbital management, PO thiamine/folic acid supplement, telemetry, continuous pulse ox, continue PTA meds, trend labs, replete electrolytes as needed          Wt Readings from Last 1 Encounters:   06/11/23 74 8 kg (164 lb 12 8 oz)     Vital Signs:   Date/Time Temp Pulse Resp BP MAP (mmHg) SpO2 O2 Device   06/12/23 1115 97 5 °F (36 4 °C) 90 18 140/92 108 92 % None (Room air)   06/12/23 0944 97 8 °F (36 6 °C) 103 18 135/89 -- 98 % --   06/12/23 0642 99 2 °F (37 3 °C) 90 18 150/98 115 96 % None (Room air)   06/12/23 0549 97 5 °F (36 4 °C) 103 18 159/111 Abnormal  127 96 % None (Room air)   06/12/23 0130 -- 93 18 -- -- 99 % None (Room air)   06/11/23 2326 98 7 °F (37 1 °C) 92 18 139/93 108 98 % None (Room air)       Severity of Ethanol Withdrawal Scale (SEWS):    06/12/23 1131 06/12/23 0949 06/12/23 0825 06/12/23 0643 06/12/23 0554 06/11/23 2335   ANXIETY: Do you feel that something bad is about to happen to you right now? 0  -LB 0  -LB 0  -LB 0  -NT 3  -NT 0   NAUSEA and DRY HEAVES or VOMITING? 0  -LB 0  -LB 0  -LB 0  -NT 0  -NT 0   SWEATING: (includes moist palms, sweating now)? Score 0 or 2 2  -LB 2  -LB 0  -LB 0  -NT 0  -NT 0   TREMOR: with arms extended eyes closed? 2  -LB 2  -LB 2  -LB 2  -NT 2  -NT 0   AGITATION: Fidgety, restless, pacing?  0  -LB 0  -LB 0  -LB 3  -NT 0  -NT 0   DISORIENTATION: 0  -LB 0  -LB 0  -LB 0  -NT 0  -NT 0   HALLUCINATIONS: 0  -LB 0  -LB 0  -LB 0  -NT 0  -NT 0   VITAL SIGNS: ANY (Pulse >085, Diastolic BP >49, Temp >29 3) 0  -LB 3  -LB 3  -LB 3  -NT 3  -NT 0   SEWS Total Score 4  -LB 7  -LB 5  -LB 8  -NT 8  -NT 0         Pertinent Labs/Diagnostic Test Results:   Results from last 7 days   Lab Units 06/12/23  0603 06/11/23  1924 06/11/23  1913   HEMATOCRIT % 40 6  --  43 5   HEMATOCRIT, ISTAT %  --  43  --    HEMOGLOBIN g/dL 13 8  --  14 8   I STAT HEMOGLOBIN g/dl  --  14 6  --    NEUTROS ABS Thousands/µL  --   --  3 42   PLATELETS Thousands/uL 161  --  165   WBC Thousand/uL 5 75  --  5 43         Results from last 7 days   Lab Units 06/12/23  0603 06/11/23  1924 06/11/23  1914   ANION GAP mmol/L 13  --  13   BUN mg/dL 5  --  5   CALCIUM, IONIZED, ISTAT mmol/L  --  1 07*  --    CALCIUM mg/dL 8 6  --  9 3   CHLORIDE mmol/L 102  --  98   CO2 mmol/L 23  -- 25   CO2, I-STAT mmol/L  --  26  --    CREATININE mg/dL 0 76  --  0 79   EGFR ml/min/1 73sq m 101  --  99   POTASSIUM mmol/L 3 8  --  3 8   MAGNESIUM mg/dL 1 9  --   --    SODIUM mmol/L 138  --  136     Results from last 7 days   Lab Units 06/12/23 0603 06/11/23 1914   ALBUMIN g/dL 3 5 3 7   ALK PHOS U/L 94 104   ALT U/L 26 30   AST U/L 44* 53*   TOTAL BILIRUBIN mg/dL 1 06* 0 94   TOTAL PROTEIN g/dL 6 5 7 6         Results from last 7 days   Lab Units 06/12/23 0603 06/11/23  1914   GLUCOSE RANDOM mg/dL 81 97     Results from last 7 days   Lab Units 06/11/23  1924   HCO3, NATHAN ISTAT mmol/L 24 7   I STAT BASE EXC mmol/L 1   PCO2, NATHAN ISTAT mm HG 36 3*   PH, NATHAN I-STAT  7 442*   PO2, NATHAN ISTAT mm HG 45 0   I STAT O2 SAT % 83     Results from last 7 days   Lab Units 06/11/23 1913   INR  0 94   PROTIME seconds 13 2   PTT seconds 28     Results from last 7 days   Lab Units 06/11/23 1914   ETHANOL LVL mg/dL 198*       Present on Admission:  • Alcohol withdrawal syndrome with complication (HCC)  • Alcohol use disorder, severe, dependence (HCC)  • Macrocytosis without anemia  • Hearing impaired  • Peripheral neuropathy      Admitting Diagnosis: Alcohol abuse  Age/Sex: 62 y o  male  Admission Orders:  Regular Diet  SCDs  Fall & Seizure Precautions  SEWS monitoring  Telemetry & Continuous Pulse Ox      Scheduled Medications:  enoxaparin, 40 mg, Subcutaneous, Daily  folic acid, 1 mg, Oral, Daily  multivitamin-minerals, 1 tablet, Oral, Daily  thiamine, 100 mg, Oral, Daily    Continuous IV Infusions:  sodium chloride, 75 mL/hr, Intravenous, Continuous    PRN Meds:  acetaminophen, 650 mg, Oral, Q6H PRN  ondansetron, 4 mg, Intravenous, Q6H PRN  traZODone, 50 mg, Oral, HS PRN  phenobarbital, 260 mg, Intravenous; 6/12 x1  phenobarbital, 130 mg, Intravenous; 6/12 x2  phenobarbital, 64 8 mg, Oral; 6/12 x2        IP CONSULT TO CASE MANAGEMENT    Network Utilization Review Department  ATTENTION: Please call with any questions or concerns to [phone] and carefully listen to the prompts so that you are directed to the right person  All voicemails are confidential   Kenn Leavitt all requests for admission clinical reviews, approved or denied determinations and any other requests to dedicated fax number below belonging to the campus where the patient is receiving treatment   List of dedicated fax numbers for the Facilities:  [address] DENIALS (Administrative/Medical Necessity) [phone]   [address] (Maternity/NICU/Pediatrics) [phone]   8 Louann Orozco [phone]   [facility] [phone]   [address] [address] [facility] [phone]   [facility]7 [facility] Chong Salazar [facility] 134 815 [facility] [phone]

## 2023-06-12 NOTE — ASSESSMENT & PLAN NOTE
· Patient reports daily alcohol use, consuming 4-6 beers daily   · Last drink the evening of 6/11  · Has never been in treatment for alcohol use before  · Case management for assistance in discharge planning   · Pt discharged to [place] for inpatient rehab  · See Alcohol Withdrawal

## 2023-06-12 NOTE — EMTALA/ACUTE CARE TRANSFER
[facility] EMERGENCY DEPARTMENT  [address]  [place] [address] [address]  Dept: [phone]      EMTALA TRANSFER CONSENT    NAME Talib Virk DOB 1965                              MRN [id number]    I have been informed of my rights regarding examination, treatment, and transfer   by Dr Tomi Castellanos DO    Benefits: Specialized equipment and/or services available at the receiving facility (Include comment)________________________    Risks: Potential for delay in receiving treatment      Consent for Transfer:  I acknowledge that my medical condition has been evaluated and explained to me by the emergency department physician or other qualified medical person and/or my attending physician, who has recommended that I be transferred to the service of  Accepting Physician: Dr Anila Nino at [address], [address] : [facility]  The above potential benefits of such transfer, the potential risks associated with such transfer, and the probable risks of not being transferred have been explained to me, and I fully understand them  The doctor has explained that, in my case, the benefits of transfer outweigh the risks  I agree to be transferred  I authorize the performance of emergency medical procedures and treatments upon me in both transit and upon arrival at the receiving facility  Additionally, I authorize the release of any and all medical records to the receiving facility and request they be transported with me, if possible  I understand that the safest mode of transportation during a medical emergency is an ambulance and that the Hospital advocates the use of this mode of transport  Risks of traveling to the receiving facility by car, including absence of medical control, life sustaining equipment, such as oxygen, and medical personnel has been explained to me and I fully understand them      (SHERRON CORRECT BOX BELOW)  [  ]  I consent to the stated transfer and to be transported by ambulance/helicopter  [  ]  I consent to the stated transfer, but refuse transportation by ambulance and accept full responsibility for my transportation by car  I understand the risks of non-ambulance transfers and I exonerate the Hospital and its staff from any deterioration in my condition that results from this refusal     X___________________________________________    DATE  23  TIME________  Signature of patient or legally responsible individual signing on patient behalf           RELATIONSHIP TO PATIENT_________________________          Provider Certification    NAME Ernesto Oropeza                                         1965                              MRN [id number]    A medical screening exam was performed on the above named patient  Based on the examination:    Condition Necessitating Transfer The primary encounter diagnosis was Alcohol abuse  A diagnosis of Alcohol use disorder, severe, dependence ([facility] [address] ) was also pertinent to this visit  Patient Condition: The patient has been stabilized such that within reasonable medical probability, no material deterioration of the patient condition or the condition of the unborn child(cassandra) is likely to result from the transfer    Reason for Transfer: Level of Care needed not available at this facility    Transfer Requirements: Facility [address] [place] [address]   · Space available and qualified personnel available for treatment as acknowledged by    · Agreed to accept transfer and to provide appropriate medical treatment as acknowledged by       Dr Dorian Barrera  · Appropriate medical records of the examination and treatment of the patient are provided at the time of transfer   [address], [address] _______  · Transfer will be performed by qualified personnel from    and appropriate transfer equipment as required, including the use of necessary and appropriate life support measures      Provider Certification: I have examined the patient and explained the following risks and benefits of being transferred/refusing transfer to the patient/family:  General risk, such as traffic hazards, adverse weather conditions, rough terrain or turbulence, possible failure of equipment (including vehicle or aircraft), or consequences of actions of persons outside the control of the transport personnel      Based on these reasonable risks and benefits to the patient and/or the unborn child(cassandra), and based upon the information available at the time of the patient’s examination, I certify that the medical benefits reasonably to be expected from the provision of appropriate medical treatments at another medical facility outweigh the increasing risks, if any, to the individual’s medical condition, and in the case of labor to the unborn child, from effecting the transfer      X____________________________________________ DATE 06/11/23        TIME_______      ORIGINAL - SEND TO MEDICAL RECORDS   COPY - SEND WITH PATIENT DURING TRANSFER

## 2023-06-12 NOTE — ASSESSMENT & PLAN NOTE
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

## 2023-06-12 NOTE — ED PROVIDER NOTES
History  Chief Complaint   Patient presents with   • Detox Evaluation     Pt to ED for alcohol detox  Pt states he has never been through withdrawal before  Pt states he had 4 beers today, usually has 6 at end of the day everyday Pt used to drink vodka but hasnt had any vodka in 4 months  51-year-old male presents for evaluation of alcohol detoxification  Patient reports drinking approximately 6 beers daily for the last few months  Patient also used to drink vodka heavily but states he gave up the hard liquor for concentrated beer  Patient came straight here from the bar brought by his friends that were concerned about his alcohol abuse  Patient denies history of withdrawal seizures  Denies any complaints at this time  None       History reviewed  No pertinent past medical history  History reviewed  No pertinent surgical history  History reviewed  No pertinent family history  I have reviewed and agree with the history as documented  E-Cigarette/Vaping   • E-Cigarette Use Never User      E-Cigarette/Vaping Substances     Social History     Tobacco Use   • Smoking status: Never   • Smokeless tobacco: Never   Vaping Use   • Vaping Use: Never used   Substance Use Topics   • Alcohol use: Yes     Alcohol/week: 4 0 standard drinks of alcohol     Types: 4 Cans of beer per week   • Drug use: Never       Review of Systems   Constitutional: Negative for fever  Neurological: Negative for seizures  Physical Exam  Physical Exam  Vitals and nursing note reviewed  Constitutional:       General: He is not in acute distress  Appearance: He is well-developed  HENT:      Head: Normocephalic and atraumatic  Right Ear: External ear normal       Left Ear: External ear normal       Nose: Nose normal    Eyes:      General: No scleral icterus  Pulmonary:      Effort: Pulmonary effort is normal  No respiratory distress  Abdominal:      General: There is no distension        Palpations: Abdomen is soft  Tenderness: There is no abdominal tenderness  Musculoskeletal:         General: No deformity  Normal range of motion  Cervical back: Normal range of motion and neck supple  Skin:     General: Skin is warm  Findings: No rash  Neurological:      General: No focal deficit present  Mental Status: He is alert        Gait: Gait normal    Psychiatric:         Mood and Affect: Mood normal          Vital Signs  ED Triage Vitals [06/11/23 1834]   Temperature Pulse Respirations Blood Pressure SpO2   98 4 °F (36 9 °C) 101 18 (!) 156/106 99 %      Temp src Heart Rate Source Patient Position - Orthostatic VS BP Location FiO2 (%)   -- Monitor Sitting Right arm --      Pain Score       No Pain           Vitals:    06/11/23 1834   BP: (!) 156/106   Pulse: 101   Patient Position - Orthostatic VS: Sitting         Visual Acuity  Visual Acuity    Flowsheet Row Most Recent Value   L Pupil Size (mm) 3   R Pupil Size (mm) 3          ED Medications  Medications   sodium chloride 0 9 % bolus 1,000 mL (0 mL Intravenous Stopped 6/11/23 2151)       Diagnostic Studies  Results Reviewed     Procedure Component Value Units Date/Time    Comprehensive metabolic panel [[id number]]  (Abnormal) Collected: 06/11/23 1914    Lab Status: Final result Specimen: Blood from Arm, Left Updated: 06/11/23 1940     Sodium 136 mmol/L      Potassium 3 8 mmol/L      Chloride 98 mmol/L      CO2 25 mmol/L      ANION GAP 13 mmol/L      BUN 5 mg/dL      Creatinine 0 79 mg/dL      Glucose 97 mg/dL      Calcium 9 3 mg/dL      AST 53 U/L      ALT 30 U/L      Alkaline Phosphatase 104 U/L      Total Protein 7 6 g/dL      Albumin 3 7 g/dL      Total Bilirubin 0 94 mg/dL      eGFR 99 ml/min/1 73sq m     Narrative:      [place] guidelines for Chronic Kidney Disease (CKD):   •  Stage 1 with normal or high GFR (GFR > 90 mL/min/1 73 square meters)  •  Stage 2 Mild CKD (GFR = 60-89 mL/min/1 73 square meters)  • Stage 3A Moderate CKD (GFR = 45-59 mL/min/1 73 square meters)  •  Stage 3B Moderate CKD (GFR = 30-44 mL/min/1 73 square meters)  •  Stage 4 Severe CKD (GFR = 15-29 mL/min/1 73 square meters)  •  Stage 5 End Stage CKD (GFR <15 mL/min/1 73 square meters)  Note: GFR calculation is accurate only with a steady state creatinine    Ethanol [[id number]]  (Abnormal) Collected: 06/11/23 1914    Lab Status: Final result Specimen: Blood from Arm, Left Updated: 06/11/23 1940     Ethanol Lvl 198 mg/dL     Protime-INR [[id number]]  (Normal) Collected: 06/11/23 1913    Lab Status: Final result Specimen: Blood from Arm, Left Updated: 06/11/23 1938     Protime 13 2 seconds      INR 0 94    APTT [[id number]]  (Normal) Collected: 06/11/23 1913    Lab Status: Final result Specimen: Blood from Arm, Left Updated: 06/11/23 1938     PTT 28 seconds     POCT Blood Gas (CG8+) [[id number]]  (Abnormal) Collected: 06/11/23 1924    Lab Status: Final result Specimen: Venous Updated: 06/11/23 1928     ph, Bernardino ISTAT 7 442     pCO2, [place] i-STAT 36 3 mm HG      pO2, [place] i-STAT 45 0 mm HG      BE, i-STAT 1 mmol/L      HCO3, [place] i-STAT 24 7 mmol/L      CO2, i-STAT 26 mmol/L      O2 Sat, i-STAT 83 %      SODIUM, I-STAT 136 mmol/l      Potassium, i-STAT 3 8 mmol/L      Calcium, Ionized i-STAT 1 07 mmol/L      Hct, i-STAT 43 %      Hgb, i-STAT 14 6 g/dl      Glucose, i-STAT 100 mg/dl      Specimen Type VENOUS    CBC and differential [[id number]]  (Abnormal) Collected: 06/11/23 1913    Lab Status: Final result Specimen: Blood from Arm, Left Updated: 06/11/23 1923     WBC 5 43 Thousand/uL      RBC 4 36 Million/uL      Hemoglobin 14 8 g/dL      Hematocrit 43 5 %       fL      MCH 33 9 pg      MCHC 34 0 g/dL      RDW 11 2 %      MPV 8 7 fL      Platelets 101 Thousands/uL      nRBC 0 /100 WBCs      Neutrophils Relative 62 %      Immat GRANS % 0 %      Lymphocytes Relative 25 %      Monocytes Relative 11 %      Eosinophils Relative 1 %      Basophils Relative 1 %      Neutrophils Absolute 3 42 Thousands/µL      Immature Grans Absolute 0 01 Thousand/uL      Lymphocytes Absolute 1 35 Thousands/µL      Monocytes Absolute 0 57 Thousand/µL      Eosinophils Absolute 0 05 Thousand/µL      Basophils Absolute 0 03 Thousands/µL                  No orders to display              Procedures  Procedures         ED Course  ED Course as of 06/11/23 2217   Sun Jun 11, 202311, 2023 2001 Ethanol(!)                               SBIRT 22yo+    Wai Pérez Most Recent Value   Initial Alcohol Screen: US AUDIT-C     1  How often do you have a drink containing alcohol? 6 Filed at: 06/11/2023 1838   2  How many drinks containing alcohol do you have on a typical day you are drinking? 4 Filed at: 06/11/2023 1838   3a  Male UNDER 65: How often do you have five or more drinks on one occasion? 6 Filed at: 06/11/2023 1838   Audit-C Score 16 Filed at: 06/11/2023 1838   Full Alcohol Screen: US AUDIT    4  How often during the last year have you found that you were not able to stop drinking once you had started? 3 Filed at: 06/11/2023 1838   6  How often in past year have you needed a first drink in the morning to get yourself going after a heavy drinking session? 0 Filed at: 06/11/2023 1838   7  How often in past year have you had feeling of guilt or remorse after drinking? 0 Filed at: 06/11/2023 1838   8  How often in past year have you been unable to remember what happened night before because you had been drinking? 0 Filed at: 06/11/2023 1838   9  Have you or someone else been injured as a result of your drinking? 0 Filed at: 06/11/2023 1838   10  Has a relative, friend, doctor or other health worker been concerned about your drinking and suggested you cut down?  0 Filed at: 06/11/2023 8511   ADALID: How many times in the past year have you    Used an illegal drug or used a prescription medication for non-medical reasons?  Never Filed at: 06/11/2023 0396                    Medical Decision Making  26-year-old male presenting for alcohol detoxification  Obtain labs, liver enzymes  Symptom control  Discussed case with detox team at [facility] who accepted patient for alcohol detoxification  Alcohol abuse: acute illness or injury  Amount and/or Complexity of Data Reviewed  Labs: ordered  Decision-making details documented in ED Course  Disposition  Final diagnoses:   Alcohol abuse     Time reflects when diagnosis was documented in both MDM as applicable and the Disposition within this note     Time User Action Codes Description Comment    6/11/2023  7:49 PM Sonja Saldana Add [F10 10] Alcohol abuse     6/11/2023  8:04 PM Reji Nguyen Add [F10 20] Alcohol use disorder, severe, dependence [facility])       ED Disposition     ED Disposition   Transfer to Another Facility-In Network    Condition   --    Date/Time   Sun Jun 11, 2023  7:49 PM    Comment   Matias Herrera should be transferred [facility] to [facility]             MD Documentation    Leland Kirkpatrick Most Recent Value   Patient Condition The patient has been stabilized such that within reasonable medical probability, no material deterioration of the patient condition or the condition of the unborn child(cassandra) is likely to result from the transfer   Reason for Transfer Level of Care needed not available at this facility   Benefits of Transfer Specialized equipment and/or services available at the receiving facility (Include comment)________________________   Risks of Transfer Potential for delay in receiving treatment   Accepting Physician Dr Kayla Nicholson Name, [facility]   Sending MD [facility]   Provider Certification General risk, such as traffic hazards, adverse weather conditions, rough terrain or turbulence, possible failure of equipment (including vehicle or aircraft), or consequences of actions of persons outside the control of the transport personnel      RN Documentation    [address] Name, [address]  [address] Karen [address]      Follow-up Information    None         Patient's Medications    No medications on file       No discharge procedures on file      PDMP Review     None          ED Provider  Electronically Signed by           Yary Bertrand DO  06/11/23 2349

## 2023-06-13 VITALS
DIASTOLIC BLOOD PRESSURE: 87 MMHG | SYSTOLIC BLOOD PRESSURE: 127 MMHG | BODY MASS INDEX: 24.98 KG/M2 | OXYGEN SATURATION: 98 % | HEART RATE: 96 BPM | TEMPERATURE: 98.3 F | WEIGHT: 164.8 LBS | RESPIRATION RATE: 16 BRPM | HEIGHT: 68 IN

## 2023-06-13 PROBLEM — F10.939 ALCOHOL WITHDRAWAL SYNDROME WITH COMPLICATION (HCC): Status: RESOLVED | Noted: 2023-06-11 | Resolved: 2023-06-13

## 2023-06-13 LAB
ALBUMIN SERPL BCP-MCNC: 3.5 G/DL (ref 3.5–5)
ALP SERPL-CCNC: 103 U/L (ref 34–104)
ALT SERPL W P-5'-P-CCNC: 20 U/L (ref 7–52)
ANION GAP SERPL CALCULATED.3IONS-SCNC: 10 MMOL/L (ref 4–13)
AST SERPL W P-5'-P-CCNC: 28 U/L (ref 13–39)
BILIRUB SERPL-MCNC: 1.73 MG/DL (ref 0.2–1)
BUN SERPL-MCNC: 5 MG/DL (ref 5–25)
CALCIUM SERPL-MCNC: 8.9 MG/DL (ref 8.4–10.2)
CHLORIDE SERPL-SCNC: 99 MMOL/L (ref 96–108)
CO2 SERPL-SCNC: 26 MMOL/L (ref 21–32)
CREAT SERPL-MCNC: 0.83 MG/DL (ref 0.6–1.3)
ERYTHROCYTE [DISTWIDTH] IN BLOOD BY AUTOMATED COUNT: 11.3 % (ref 11.6–15.1)
GFR SERPL CREATININE-BSD FRML MDRD: 96 ML/MIN/1.73SQ M
GLUCOSE SERPL-MCNC: 87 MG/DL (ref 65–140)
HCT VFR BLD AUTO: 43.3 % (ref 36.5–49.3)
HGB BLD-MCNC: 14.5 G/DL (ref 12–17)
MAGNESIUM SERPL-MCNC: 1.8 MG/DL (ref 1.9–2.7)
MCH RBC QN AUTO: 33.3 PG (ref 26.8–34.3)
MCHC RBC AUTO-ENTMCNC: 33.5 G/DL (ref 31.4–37.4)
MCV RBC AUTO: 100 FL (ref 82–98)
PLATELET # BLD AUTO: 142 THOUSANDS/UL (ref 149–390)
PMV BLD AUTO: 9.3 FL (ref 8.9–12.7)
POTASSIUM SERPL-SCNC: 3.5 MMOL/L (ref 3.5–5.3)
PROT SERPL-MCNC: 7 G/DL (ref 6.4–8.4)
RBC # BLD AUTO: 4.35 MILLION/UL (ref 3.88–5.62)
SODIUM SERPL-SCNC: 135 MMOL/L (ref 135–147)
WBC # BLD AUTO: 6.58 THOUSAND/UL (ref 4.31–10.16)

## 2023-06-13 PROCEDURE — 83735 ASSAY OF MAGNESIUM: CPT | Performed by: EMERGENCY MEDICINE

## 2023-06-13 PROCEDURE — 85027 COMPLETE CBC AUTOMATED: CPT | Performed by: EMERGENCY MEDICINE

## 2023-06-13 PROCEDURE — 99232 SBSQ HOSP IP/OBS MODERATE 35: CPT | Performed by: PHYSICIAN ASSISTANT

## 2023-06-13 PROCEDURE — 80053 COMPREHEN METABOLIC PANEL: CPT | Performed by: EMERGENCY MEDICINE

## 2023-06-13 PROCEDURE — 97163 PT EVAL HIGH COMPLEX 45 MIN: CPT

## 2023-06-13 RX ORDER — MAGNESIUM SULFATE HEPTAHYDRATE 40 MG/ML
2 INJECTION, SOLUTION INTRAVENOUS ONCE
Status: COMPLETED | OUTPATIENT
Start: 2023-06-13 | End: 2023-06-13

## 2023-06-13 RX ADMIN — FOLIC ACID 1 MG: 1 TABLET ORAL at 08:21

## 2023-06-13 RX ADMIN — THIAMINE HCL TAB 100 MG 100 MG: 100 TAB at 08:21

## 2023-06-13 RX ADMIN — MAGNESIUM SULFATE HEPTAHYDRATE 2 G: 2 INJECTION, SOLUTION INTRAVENOUS at 11:24

## 2023-06-13 RX ADMIN — ENOXAPARIN SODIUM 40 MG: 40 INJECTION SUBCUTANEOUS at 08:21

## 2023-06-13 RX ADMIN — MULTIPLE VITAMINS W/ MINERALS TAB 1 TABLET: TAB ORAL at 08:21

## 2023-06-13 NOTE — PLAN OF CARE
Problem: PAIN - ADULT  Goal: Verbalizes/displays adequate comfort level or baseline comfort level  Description: Interventions:  - Encourage patient to monitor pain and request assistance  - Assess pain using appropriate pain scale  - Administer analgesics based on type and severity of pain and evaluate response  - Implement non-pharmacological measures as appropriate and evaluate response  - Consider cultural and social influences on pain and pain management  - Notify physician/advanced practitioner if interventions unsuccessful or patient reports new pain  Outcome: Progressing     Problem: INFECTION - ADULT  Goal: Absence or prevention of progression during hospitalization  Description: INTERVENTIONS:  - Assess and monitor for signs and symptoms of infection  - Monitor lab/diagnostic results  - Monitor all insertion sites, i e  indwelling lines, tubes, and drains  - Monitor endotracheal if appropriate and nasal secretions for changes in amount and color  - [place] appropriate cooling/warming therapies per order  - Administer medications as ordered  - Instruct and encourage patient and family to use good hand hygiene technique  - Identify and instruct in appropriate isolation precautions for identified infection/condition  Outcome: Progressing  Goal: Absence of fever/infection during neutropenic period  Description: INTERVENTIONS:  - Monitor WBC    Outcome: Progressing     Problem: SAFETY ADULT  Goal: Patient will remain free of falls  Description: INTERVENTIONS:  - Educate patient/family on patient safety including physical limitations  - Instruct patient to call for assistance with activity   - Consult OT/PT to assist with strengthening/mobility   - Keep Call bell within reach  - Keep bed low and locked with side rails adjusted as appropriate  - Keep care items and personal belongings within reach  - Initiate and maintain comfort rounds  - Make Fall Risk Sign visible to staff  - Offer Toileting every 2 Hours, in advance of need  - Initiate/Maintain bed alarm  - Obtain necessary fall risk management equipment: alarm   - Apply yellow socks and bracelet for high fall risk patients  - Consider moving patient to room near nurses station  Outcome: Progressing  Goal: Maintain or return to baseline ADL function  Description: INTERVENTIONS:  -  Assess patient's ability to carry out ADLs; assess patient's baseline for ADL function and identify physical deficits which impact ability to perform ADLs (bathing, care of mouth/teeth, toileting, grooming, dressing, etc )  - Assess/evaluate cause of self-care deficits   - Assess range of motion  - Assess patient's mobility; develop plan if impaired  - Assess patient's need for assistive devices and provide as appropriate  - Encourage maximum independence but intervene and supervise when necessary  - Involve family in performance of ADLs  - Assess for home care needs following discharge   - Consider OT consult to assist with ADL evaluation and planning for discharge  - Provide patient education as appropriate  Outcome: Progressing  Goal: Maintains/Returns to pre admission functional level  Description: INTERVENTIONS:  - Perform BMAT or MOVE assessment daily    - Set and communicate daily mobility goal to care team and patient/family/caregiver  - Collaborate with rehabilitation services on mobility goals if consulted  - Perform Range of Motion 4 times a day  - Reposition patient every 2hours    - Dangle patient 4 times a day  - Stand patient 4 times a day  - Ambulate patient 4 times a day  - Out of bed to chair 4 times a day   - Out of bed for meals 4 times a day  - Out of bed for toileting  - Record patient progress and toleration of activity level   Outcome: Progressing     Problem: DISCHARGE PLANNING  Goal: Discharge to home or other facility with appropriate resources  Description: INTERVENTIONS:  - Identify barriers to discharge w/patient and caregiver  - Arrange for needed discharge resources and transportation as appropriate  - Identify discharge learning needs (meds, wound care, etc )  - Arrange for interpretive services to assist at discharge as needed  - Refer to Case Management Department for coordinating discharge planning if the patient needs post-hospital services based on physician/advanced practitioner order or complex needs related to functional status, cognitive ability, or social support system  Outcome: Progressing     Problem: Knowledge Deficit  Goal: Patient/family/caregiver demonstrates understanding of disease process, treatment plan, medications, and discharge instructions  Description: Complete learning assessment and assess knowledge base    Interventions:  - Provide teaching at level of understanding  - Provide teaching via preferred learning methods  Outcome: Progressing

## 2023-06-13 NOTE — PLAN OF CARE
Problem: PAIN - ADULT  Goal: Verbalizes/displays adequate comfort level or baseline comfort level  Description: Interventions:  - Encourage patient to monitor pain and request assistance  - Assess pain using appropriate pain scale  - Administer analgesics based on type and severity of pain and evaluate response  - Implement non-pharmacological measures as appropriate and evaluate response  - Consider cultural and social influences on pain and pain management  - Notify physician/advanced practitioner if interventions unsuccessful or patient reports new pain  Outcome: Progressing     Problem: INFECTION - ADULT  Goal: Absence or prevention of progression during hospitalization  Description: INTERVENTIONS:  - Assess and monitor for signs and symptoms of infection  - Monitor lab/diagnostic results  - Monitor all insertion sites, i e  indwelling lines, tubes, and drains  - Monitor endotracheal if appropriate and nasal secretions for changes in amount and color  - [place] appropriate cooling/warming therapies per order  - Administer medications as ordered  - Instruct and encourage patient and family to use good hand hygiene technique  - Identify and instruct in appropriate isolation precautions for identified infection/condition  Outcome: Progressing  Goal: Absence of fever/infection during neutropenic period  Description: INTERVENTIONS:  - Monitor WBC    Outcome: Progressing     Problem: SAFETY ADULT  Goal: Patient will remain free of falls  Description: INTERVENTIONS:  - Educate patient/family on patient safety including physical limitations  - Instruct patient to call for assistance with activity   - Consult OT/PT to assist with strengthening/mobility   - Keep Call bell within reach  - Keep bed low and locked with side rails adjusted as appropriate  - Keep care items and personal belongings within reach  - Initiate and maintain comfort rounds  - Make Fall Risk Sign visible to staff  - Apply yellow socks and bracelet for high fall risk patients  - Consider moving patient to room near nurses station  Outcome: Progressing  Goal: Maintain or return to baseline ADL function  Description: INTERVENTIONS:  -  Assess patient's ability to carry out ADLs; assess patient's baseline for ADL function and identify physical deficits which impact ability to perform ADLs (bathing, care of mouth/teeth, toileting, grooming, dressing, etc )  - Assess/evaluate cause of self-care deficits   - Assess range of motion  - Assess patient's mobility; develop plan if impaired  - Assess patient's need for assistive devices and provide as appropriate  - Encourage maximum independence but intervene and supervise when necessary  - Involve family in performance of ADLs  - Assess for home care needs following discharge   - Consider OT consult to assist with ADL evaluation and planning for discharge  - Provide patient education as appropriate  Outcome: Progressing  Goal: Maintains/Returns to pre admission functional level  Description: INTERVENTIONS:  - Perform BMAT or MOVE assessment daily    - Set and communicate daily mobility goal to care team and patient/family/caregiver     - Collaborate with rehabilitation services on mobility goals if consulted  - Out of bed for toileting  - Record patient progress and toleration of activity level   Outcome: Progressing     Problem: DISCHARGE PLANNING  Goal: Discharge to home or other facility with appropriate resources  Description: INTERVENTIONS:  - Identify barriers to discharge w/patient and caregiver  - Arrange for needed discharge resources and transportation as appropriate  - Identify discharge learning needs (meds, wound care, etc )  - Arrange for interpretive services to assist at discharge as needed  - Refer to Case Management Department for coordinating discharge planning if the patient needs post-hospital services based on physician/advanced practitioner order or complex needs related to functional status, cognitive ability, or social support system  Outcome: Progressing     Problem: Knowledge Deficit  Goal: Patient/family/caregiver demonstrates understanding of disease process, treatment plan, medications, and discharge instructions  Description: Complete learning assessment and assess knowledge base    Interventions:  - Provide teaching at level of understanding  - Provide teaching via preferred learning methods  Outcome: Progressing

## 2023-06-13 NOTE — PHYSICAL THERAPY NOTE
Physical Therapy Evaluation    Patient's Name: Amish Tobar    Admitting Diagnosis  Alcohol abuse    Problem List  Patient Active Problem List   Diagnosis    Alcohol use disorder, severe, dependence ([facility] [address] )    Macrocytosis without anemia    Hearing impaired    Peripheral neuropathy       Past Medical History  Past Medical History:   Diagnosis Date    Alcohol withdrawal syndrome with complication ([facility] [address] ) 1/37/9603       Past Surgical History  History reviewed  No pertinent surgical history  Recent Imaging  No orders to display       Recent Vital Signs  Vitals:    06/12/23 2200 06/13/23 0549 06/13/23 0700 06/13/23 1120   BP: (!) 153/102 105/83 120/73 123/86   BP Location: Right arm Left arm Left arm Right arm   Pulse: 93 93 91 84   Resp: 18 18 18 18   Temp: 97 8 °F (36 6 °C) 99 1 °F (37 3 °C) 98 9 °F (37 2 °C) 99 4 °F (37 4 °C)   TempSrc: Temporal Temporal Temporal Temporal   SpO2: 98% 99% 96% 98%   Weight:       Height:            06/13/23 0852   PT Last Visit   PT Visit Date 06/13/23   Note Type   Note type Evaluation   Pain Assessment   Pain Assessment Tool 0-10   Pain Score No Pain   Restrictions/Precautions   Weight Bearing Precautions Per Order No   Home Living   Type of [address] One level;Stairs to enter with rails   Bathroom Shower/Tub Tub/shower unit   Bathroom Toilet Standard   Home Equipment Cane   Prior Function   Level of [place] Independent with ADLs; Independent with functional mobility; Independent with IADLS   Lives With Alone   Receives Help From Friend(s)   Falls in the last 6 months 0   Vocational On disability   General   Family/Caregiver Present No   Cognition   Overall Cognitive Status WFL   Arousal/Participation Alert   Orientation Level Oriented X4   Memory Within functional limits   Following Commands Follows all commands and directions without difficulty   RLE Assessment   RLE Assessment   (4/5)   LLE Assessment   LLE Assessment   (4/5)   Coordination   Movements are Fluid and Coordinated 1   Sensation X   Light Touch   RLE Light Touch Impaired   RLE Light Touch Comments neuropathy   LLE Light Touch Impaired   LLE Light Touch Comments neuropathy   Bed Mobility   Supine to Sit 7  Independent   Additional items Increased time required   Sit to Supine 7  Independent   Additional items Increased time required   Transfers   Sit to Stand 7  Independent   Additional items Increased time required   Stand to Sit 7  Independent   Additional items Increased time required   Ambulation/Elevation   Gait pattern Step through pattern;Decreased toe off;Decreased heel strike; Short stride;Decreased foot clearance; Improper Weight shift   Gait Assistance 7  Independent   Additional items Verbal cues   Assistive Device None   Distance 200ft x2   Stair Management Assistance 7  Independent   Additional items Verbal cues; Increased time required   Stair Management Technique Step to pattern; Foreward; One rail R   Number of Stairs 12   Balance   Static Sitting Fair +   Dynamic Sitting Fair +   Static Standing Fair   Dynamic Standing Fair -   Ambulatory Fair -   Endurance Deficit   Endurance Deficit Yes   Endurance Deficit Description reduced from baseline   Activity Tolerance   Activity Tolerance Patient tolerated treatment well   Medical Staff Made Aware spoke to CM   Nurse Made Aware spoke to RN   Assessment   Prognosis Good   Problem List Decreased strength;Decreased endurance; Impaired balance;Decreased mobility; Impaired sensation   Barriers to Discharge Inaccessible home environment;Decreased caregiver support   Goals   Patient Goals to go to rehab   Recommendation   PT Discharge Recommendation No rehabilitation needs   Equipment Recommended Cane   AM-PAC Basic Mobility Inpatient   Turning in Flat Bed Without Bedrails 4   Lying on Back to Sitting on Edge of Flat Bed Without Bedrails 4   Moving Bed to Chair 4   Standing Up From Chair Using Arms 4   Walk in Room 4   Climb 3-5 Stairs With Railing 3   Basic Mobility Inpatient Raw Score 23   Basic Mobility Standardized Score 50 88   Highest Level Of Mobility   -HLM Goal 7: Walk 25 feet or more   JH-HLM Achieved 8: Walk 250 feet ot more   End of Consult   Patient Position at End of Consult All needs within reach; Supine         ASSESSMENT                                                                                                                     Tito Sweet is a 62 y o  male admitted to [facility] on 6/11/2023 for Alcohol withdrawal syndrome with complication ([facility] [address] )  Pt  has a past medical history of Alcohol withdrawal syndrome with complication ([facility] [address] ) (7/92/3544)    PT was consulted and pt was seen on 6/13/2023 for mobility assessment and d/c planning  Pt presents supine in bed alert and agreeable to therapy  Impairments limiting pt at this time include impaired balance, decreased endurance, decreased sensation, and decreased strength  Pt is currently functioning at a independent level for bed mobility, independent level for transfers, independent level for ambulation with no assistive device, and independent for elevations  The patient's AM-PAC Basic Mobility Inpatient Short Form Raw Score is 23  A Raw score of greater than 16 suggests the patient may benefit from discharge to home  Please also refer to the recommendation of the Physical Therapist for safe discharge planning      Recommendations                                                                                                                DME: Single Point Cane    Discharge Disposition:  Home with no needs      Jennifer Akers PT, DPT

## 2023-06-13 NOTE — PROGRESS NOTES
PROGRESS NOTE  DEPARTMENT OF MEDICAL TOXICOLOGY  LEVEL 4 MEDICAL DETOX UNIT  Amish Tobar 62 y o  male MRN: [id number]  Unit/Bed#: 5T DETOX 514-01 Encounter: [id number]      Reason for Admission/Principal Problem: Alcohol withdrawal syndrome with complication  Rounding Provider: Zeinab Forbes PA-C  Attending Provider: David Gates MD   6/11/2023 11:05 PM         Peripheral neuropathy  Assessment & Plan  · History of peripheral neuropathy in bilateral lower extremities  · Uses a cane at baseline  · Not on any current medications, patient not interested in being started on medication  · Continue using cane with ambulation  · PT consult- recommendations appreciated    Hearing impaired  Assessment & Plan  · 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 ears which improves his hearing  · Does not have hearing aids or cochlear implants  · Patient best able to hear when standing directly in front of him, uses lip reading to supplement hearing  · Referral for ENT at time of discharge   · His previous provider retired and he has not reestablished with a new one     Macrocytosis without anemia  Assessment & Plan  Recent Labs     06/11/23  1913 06/11/23  1924   HGB 14 8 14 6   *  --      · In the setting of chronic alcohol use   · Vitamin supplementation as above   · Continue to monitor  · Encourage alcohol cessation    Alcohol use disorder, severe, dependence ([facility] [address] )  Assessment & Plan  · Patient reports daily alcohol use, consuming 4-6 beers daily   · Last drink the evening of 6/11  · Has never been in treatment for alcohol use before  · Case management for assistance in discharge planning   · Patient potentially interested inpatient rehab, currently contemplating   · See Alcohol Withdrawal      * Alcohol withdrawal syndrome with complication (HCC)-resolved as of 6/13/2023  Assessment & Plan  · Patient reports daily alcohol use   · Last drink the evening of 6/11  · EtOH 198 on initial labs   · Patient denies history of withdrawal seizures   · Patient currently endorses no withdrawal symptoms   · Patient initiated on SEWS protocol for symptom triggered phenobarbital therapy   · Total Pheno 780mg with improvement of symptoms  · SEWs protocol discontinued 06/13   · Continue Supplementation with MVI, Thiamine and folic acid   · Electrolyte repletion as needed  · Pulse oximetry and telemetry monitoring              VTE Pharmacologic Prophylaxis:   Pharmacologic: Enoxaparin (Lovenox)  Mechanical VTE Prophylaxis in Place: yes    Code Status: Level 1 - Full Code    Patient Centered Rounds: I have performed bedside rounds with nursing staff today  Discussions with Specialists or Other Care Team Provider: Nursing Staff, Supervising physician     Education and Discussions with Family / Patient: Care discussed with patient    Time Spent for Care: 30 minutes  More than 50% of total time spent on counseling and coordination of care as described above  Current Length of Stay: 2 day(s)    Current Patient Status: Inpatient     Certification Statement: The patient will continue to require additional inpatient hospital stay due to Alcohol withdrawal syndrome with complication Discharge Plan: Contemplating inpatient        Subjective:   Patient was seen and evaluated this morning and endorsed baseline chronic tremors that runs in the family but admits improvement of his withdrawal symptoms  Denied any nausea, vomiting, abdominal pain, chest pain, diarrhea or any other complaint on review of systems  Patient interested in inpatient rehabilitation was [facility] at [facility]      Objective:     Clinical Opiate Withdrawal Scale  Pulse: 91    SEWS Total Score: 0 (6/13/2023  5:49 AM)        Last 24 Hours Medication List:   Current Facility-Administered Medications   Medication Dose Route Frequency Provider Last Rate   • acetaminophen  650 mg Oral Q6H PRN Yvrose Krause PA-C     • enoxaparin  40 mg Subcutaneous Daily Rhea Ferro PA-C     • folic acid  1 mg Oral Daily Rhea Ferro PA-C     • magnesium sulfate  2 g Intravenous Once [facility]     • multivitamin-minerals  1 tablet Oral Daily Rhea Ferro PA-C     • ondansetron  4 mg Intravenous Q6H PRN Rhea Ferro PA-C     • thiamine  100 mg Oral Daily Rhea Ferro PA-C     • traZODone  50 mg Oral HS PRN Rhea Ferro PA-C           Vitals:   Temp (24hrs), Av 4 °F (36 9 °C), Min:97 5 °F (36 4 °C), Max:99 1 °F (37 3 °C)    Temp:  [97 5 °F (36 4 °C)-99 1 °F (37 3 °C)] 98 9 °F (37 2 °C)  HR:  [81-93] 91  Resp:  [18] 18  BP: (105-155)/() 120/73  SpO2:  [92 %-99 %] 96 %  Body mass index is 25 06 kg/m²  Input and Output Summary (last 24 hours): Intake/Output Summary (Last 24 hours) at 2023 1111  Last data filed at 2023 1300  Gross per 24 hour   Intake 240 ml   Output --   Net 240 ml       Physical Exam:   Physical Exam  Vitals and nursing note reviewed  Constitutional:       General: He is not in acute distress  Appearance: Normal appearance  He is not diaphoretic  HENT:      Head: Normocephalic and atraumatic  Nose: Nose normal  No congestion or rhinorrhea  Eyes:      General: No scleral icterus  Extraocular Movements: Extraocular movements intact  Pupils: Pupils are equal, round, and reactive to light  Cardiovascular:      Rate and Rhythm: Normal rate and regular rhythm  Pulmonary:      Effort: Pulmonary effort is normal  No respiratory distress  Breath sounds: Normal breath sounds  No stridor  Abdominal:      General: Bowel sounds are normal  There is no distension  Palpations: Abdomen is soft  Musculoskeletal:         General: No swelling or tenderness  Normal range of motion  Cervical back: Normal range of motion and neck supple  No rigidity or tenderness  Right lower leg: No edema  Left lower leg: No edema  Skin:     General: Skin is warm and dry  Capillary Refill: Capillary refill takes less than 2 seconds  Coloration: Skin is not jaundiced  Findings: No bruising or rash  Neurological:      Mental Status: He is alert and oriented to person, place, and time  GCS: GCS eye subscore is 4  GCS verbal subscore is 5  GCS motor subscore is 6  Cranial Nerves: Cranial nerves 2-12 are intact  Sensory: Sensation is intact  Motor: Tremor ( h/o essential tremors, baseline) present  Coordination: Coordination is intact  Gait: Gait is intact  Psychiatric:         Attention and Perception: He does not perceive auditory or visual hallucinations  Mood and Affect: Mood normal          Thought Content: Thought content does not include homicidal or suicidal ideation  Thought content does not include homicidal or suicidal plan  Additional Data:     Labs:   Results from last 7 days   Lab Units 06/13/23  0559 06/11/23 1924 06/11/23 1913   EOS PCT %  --   --  1   HEMATOCRIT % 43 3   < > 43 5   HEMATOCRIT, ISTAT   --    < >  --    HEMOGLOBIN g/dL 14 5   < > 14 8   I STAT HEMOGLOBIN   --    < >  --    LYMPHS PCT %  --   --  25   MONOS PCT %  --   --  11   NEUTROS PCT %  --   --  62   PLATELETS Thousands/uL 142*   < > 165   WBC Thousand/uL 6 58   < > 5 43    < > = values in this interval not displayed  Results from last 7 days   Lab Units 06/13/23  0559   ANION GAP mmol/L 10   ALBUMIN g/dL 3 5   ALK PHOS U/L 103   ALT U/L 20   AST U/L 28   BUN mg/dL 5   CALCIUM mg/dL 8 9   CHLORIDE mmol/L 99   CO2 mmol/L 26   CREATININE mg/dL 0 83   GLUCOSE RANDOM mg/dL 87   POTASSIUM mmol/L 3 5   SODIUM mmol/L 135   TOTAL BILIRUBIN mg/dL 1 73*      Results from last 7 days   Lab Units 06/11/23 1913   INR  0 94                         * I Have Reviewed All Lab Data Listed Above  * Additional Pertinent Lab Tests Reviewed:  Margaret 66 Admission Reviewed      Imaging Studies: I have personally reviewed pertinent reports  Recent Cultures (last 7 days): Today, Patient Was Seen By: Mariel Young PA-C    ** Please Note: Dictation voice to text software may have been used in the creation of this document   **

## 2023-06-13 NOTE — CASE MANAGEMENT
Cm discussed pt referral with [facility] and arranging [facility] for inpatient ALYSSIA treatment  Cm informed that there were concerns regarding pt's medical needs and pt will be reviewed  Cm stressed pt's stability and recommendation that pt enter 3 5 inpatient ALYSSIA rehab

## 2023-06-13 NOTE — NURSING NOTE
Patient sleeping in bed  SPO2, RR, HR, and telemetry WNL  No s/s of distress  SEWS deferred at this time

## 2023-06-13 NOTE — CASE MANAGEMENT
CM informed by [facility] pt could be admitted to rehab with [facility] with  time of 9am tomorrow  Cm informed pt and pt in agreement with this plan  CM informed medical and nursing staff  CM contacted pt's friend, Kit Nunez, and provided this update  Kit Nunez stated she would ensure pt's clothing is brought to him at [facility]

## 2023-06-14 LAB
ALBUMIN SERPL BCP-MCNC: 3.3 G/DL (ref 3.5–5)
ALP SERPL-CCNC: 90 U/L (ref 34–104)
ALT SERPL W P-5'-P-CCNC: 17 U/L (ref 7–52)
ANION GAP SERPL CALCULATED.3IONS-SCNC: 10 MMOL/L (ref 4–13)
AST SERPL W P-5'-P-CCNC: 21 U/L (ref 13–39)
BILIRUB SERPL-MCNC: 1.36 MG/DL (ref 0.2–1)
BUN SERPL-MCNC: 6 MG/DL (ref 5–25)
CALCIUM ALBUM COR SERPL-MCNC: 9.2 MG/DL (ref 8.3–10.1)
CALCIUM SERPL-MCNC: 8.6 MG/DL (ref 8.4–10.2)
CHLORIDE SERPL-SCNC: 99 MMOL/L (ref 96–108)
CO2 SERPL-SCNC: 26 MMOL/L (ref 21–32)
CREAT SERPL-MCNC: 0.77 MG/DL (ref 0.6–1.3)
GFR SERPL CREATININE-BSD FRML MDRD: 100 ML/MIN/1.73SQ M
GLUCOSE SERPL-MCNC: 86 MG/DL (ref 65–140)
MAGNESIUM SERPL-MCNC: 2 MG/DL (ref 1.9–2.7)
POTASSIUM SERPL-SCNC: 3.3 MMOL/L (ref 3.5–5.3)
PROT SERPL-MCNC: 6.7 G/DL (ref 6.4–8.4)
SODIUM SERPL-SCNC: 135 MMOL/L (ref 135–147)

## 2023-06-14 PROCEDURE — 80053 COMPREHEN METABOLIC PANEL: CPT

## 2023-06-14 PROCEDURE — 83735 ASSAY OF MAGNESIUM: CPT

## 2023-06-14 PROCEDURE — 99238 HOSP IP/OBS DSCHRG MGMT 30/<: CPT | Performed by: PHYSICIAN ASSISTANT

## 2023-06-14 RX ORDER — POTASSIUM CHLORIDE 20 MEQ/1
40 TABLET, EXTENDED RELEASE ORAL ONCE
Status: COMPLETED | OUTPATIENT
Start: 2023-06-14 | End: 2023-06-14

## 2023-06-14 RX ADMIN — POTASSIUM CHLORIDE 40 MEQ: 1500 TABLET, EXTENDED RELEASE ORAL at 08:13

## 2023-06-14 RX ADMIN — ENOXAPARIN SODIUM 40 MG: 40 INJECTION SUBCUTANEOUS at 08:13

## 2023-06-14 RX ADMIN — FOLIC ACID 1 MG: 1 TABLET ORAL at 08:13

## 2023-06-14 RX ADMIN — THIAMINE HCL TAB 100 MG 100 MG: 100 TAB at 08:13

## 2023-06-14 RX ADMIN — MULTIPLE VITAMINS W/ MINERALS TAB 1 TABLET: TAB ORAL at 08:13

## 2023-06-14 NOTE — DISCHARGE INSTR - AVS FIRST PAGE
Please abstain from alcohol use  You been transported to [facility] for your acute alcohol rehabilitation

## 2023-06-14 NOTE — PLAN OF CARE
Problem: PAIN - ADULT  Goal: Verbalizes/displays adequate comfort level or baseline comfort level  Description: Interventions:  - Encourage patient to monitor pain and request assistance  - Assess pain using appropriate pain scale  - Administer analgesics based on type and severity of pain and evaluate response  - Implement non-pharmacological measures as appropriate and evaluate response  - Consider cultural and social influences on pain and pain management  - Notify physician/advanced practitioner if interventions unsuccessful or patient reports new pain  Outcome: Adequate for Discharge     Problem: INFECTION - ADULT  Goal: Absence or prevention of progression during hospitalization  Description: INTERVENTIONS:  - Assess and monitor for signs and symptoms of infection  - Monitor lab/diagnostic results  - Monitor all insertion sites, i e  indwelling lines, tubes, and drains  - Monitor endotracheal if appropriate and nasal secretions for changes in amount and color  - [place] appropriate cooling/warming therapies per order  - Administer medications as ordered  - Instruct and encourage patient and family to use good hand hygiene technique  - Identify and instruct in appropriate isolation precautions for identified infection/condition  Outcome: Adequate for Discharge  Goal: Absence of fever/infection during neutropenic period  Description: INTERVENTIONS:  - Monitor WBC    Outcome: Adequate for Discharge     Problem: SAFETY ADULT  Goal: Patient will remain free of falls  Description: INTERVENTIONS:  - Educate patient/family on patient safety including physical limitations  - Instruct patient to call for assistance with activity   - Consult OT/PT to assist with strengthening/mobility   - Keep Call bell within reach  - Keep bed low and locked with side rails adjusted as appropriate  - Keep care items and personal belongings within reach  - Initiate and maintain comfort rounds  - Make Fall Risk Sign visible to staff  - Apply yellow socks and bracelet for high fall risk patients  - Consider moving patient to room near nurses station  Outcome: Adequate for Discharge  Goal: Maintain or return to baseline ADL function  Description: INTERVENTIONS:  -  Assess patient's ability to carry out ADLs; assess patient's baseline for ADL function and identify physical deficits which impact ability to perform ADLs (bathing, care of mouth/teeth, toileting, grooming, dressing, etc )  - Assess/evaluate cause of self-care deficits   - Assess range of motion  - Assess patient's mobility; develop plan if impaired  - Assess patient's need for assistive devices and provide as appropriate  - Encourage maximum independence but intervene and supervise when necessary  - Involve family in performance of ADLs  - Assess for home care needs following discharge   - Consider OT consult to assist with ADL evaluation and planning for discharge  - Provide patient education as appropriate  Outcome: Adequate for Discharge  Goal: Maintains/Returns to pre admission functional level  Description: INTERVENTIONS:  - Perform BMAT or MOVE assessment daily    - Set and communicate daily mobility goal to care team and patient/family/caregiver     - Collaborate with rehabilitation services on mobility goals if consulted  - Out of bed for toileting  - Record patient progress and toleration of activity level   Outcome: Adequate for Discharge     Problem: DISCHARGE PLANNING  Goal: Discharge to home or other facility with appropriate resources  Description: INTERVENTIONS:  - Identify barriers to discharge w/patient and caregiver  - Arrange for needed discharge resources and transportation as appropriate  - Identify discharge learning needs (meds, wound care, etc )  - Arrange for interpretive services to assist at discharge as needed  - Refer to Case Management Department for coordinating discharge planning if the patient needs post-hospital services based on physician/advanced practitioner order or complex needs related to functional status, cognitive ability, or social support system  Outcome: Adequate for Discharge     Problem: Knowledge Deficit  Goal: Patient/family/caregiver demonstrates understanding of disease process, treatment plan, medications, and discharge instructions  Description: Complete learning assessment and assess knowledge base    Interventions:  - Provide teaching at level of understanding  - Provide teaching via preferred learning methods  Outcome: Adequate for Discharge

## 2023-06-14 NOTE — DISCHARGE SUMMARY
MEDICAL DETOX UNIT, LEVEL 4  Department of Medical Toxicology  Reason for Admission/Principal Problem: Alcohol withdrawal syndrome with complications  Admitting provider: CJ Cortez DO   6/11/2023 11:05 PM       Discharging Physician / Practitioner: Johny Caba PA-C  PCP: No primary care provider on file    Admission Date:   Admission Orders (From admission, onward)     Ordered        06/11/23 2323  Inpatient Admission  Once                      Discharge Date: 06/14/23    Medical Problems     Resolved Problems  Date Reviewed: 6/14/2023          Resolved    * (Principal) Alcohol withdrawal syndrome with complication ([facility] ) 4/51/6923     Resolved by  Johny Caba PA-C          Peripheral neuropathy  Assessment & Plan  · History of peripheral neuropathy in bilateral lower extremities  · Uses a cane at baseline  · Not on any current medications, patient not interested in being started on medication  · Continue using cane with ambulation  · PT consult- recommendations appreciated  · Safe for home discharge with no assistive needs except for cane as needed    Hearing impaired  Assessment & Plan  · 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 ears which improves his hearing  · Does not have hearing aids or cochlear implants  · Patient best able to hear when standing directly in front of him, uses lip reading to supplement hearing  · Referral for ENT at time of discharge   · His previous provider retired and he has not reestablished with a new one     Macrocytosis without anemia  Assessment & Plan  Recent Labs     06/11/23 1913 06/11/23 1924   HGB 14 8 14 6   *  --      · In the setting of chronic alcohol use   · Vitamin supplementation as above   · Continue to monitor  · Encourage alcohol cessation    Alcohol use disorder, severe, dependence ([facility] )  Assessment & Plan  · Patient reports daily alcohol use, consuming 4-6 beers daily   · Last drink the evening of 6/11  · Has never been in treatment for alcohol use before  · Case management for assistance in discharge planning   · Pt discharged to [facility] for inpatient rehab  · See Alcohol Withdrawal      * Alcohol withdrawal syndrome with complication (HCC)-resolved as of 6/13/2023  Assessment & Plan  · Patient reports daily alcohol use   · Last drink the evening of 6/11  · EtOH 198 on initial labs   · Patient denies history of withdrawal seizures   · Patient currently endorses no withdrawal symptoms   · Patient initiated on SEWS protocol for symptom triggered phenobarbital therapy   · Total Pheno 780mg with improvement of symptoms  · SEWs protocol discontinued 06/13   · Continue Supplementation with MVI, Thiamine and folic acid   · Electrolyte repletion as needed  · Pulse oximetry and telemetry monitoring          Consultations During Hospital Stay:  · PT/OT, Toxicology    Procedures Performed:   · ECG    Significant Findings / Test Results:   · Hypokalemia  · Hypomagnesemia    Incidental Findings:   · None     Test Results Pending at Discharge (will require follow up): · None     Outpatient Tests Requested:  · None    Complications:  None    Reason for Admission: Alcohol withdrawal syndrome with complication    Hospital Course:     Jesus Lazo is a 62 y o  male patient who originally presented to the hospital on 6/11/2023 requesting alcohol detoxification  Patient stated that he drinks about 6 packs of beer daily with a last known consumption on 06/11/2023  Patient arrived to the ED with an EtOH of 198 and was transferred to the detox unit for detoxification  Upon arrival to the unit, patient was started on SEWs protocol with symptom treatment using phenobarbital   Patient received a total of 780 mg of phenobarbital with improvement of his withdrawal symptoms    During the course of admission to the detox unit, patient was noted to have mild hypokalemia and "hypomagnesemia which was replenished  Patient was seen by physical therapy due to his history of peripheral neuropathy and was assessed with no assistive needs required except for his cane  Patient was also seen by the  at which patient endorsed desire for inpatient rehabilitation as a result, inflammations was faxed and case was discussed with [facility] who agrees to accept patient to their facility for alcohol rehabilitation  Upon discharge, patient denied being on any home medication as a result patient was discharged to [place] [address] in stable condition  Please see above list of diagnoses and related plan for additional information  Condition at Discharge: good     Discharge Day Visit / Exam:     Subjective: Patient was seen and evaluated this morning    Vitals: Blood Pressure: 127/87 (06/13/23 1954)  Pulse: 96 (06/13/23 1954)  Temperature: 98 3 °F (36 8 °C) (06/13/23 1954)  Temp Source: Temporal (06/13/23 1954)  Respirations: 16 (06/13/23 1954)  Height: 5' 8\" (172 7 cm) (06/11/23 2326)  Weight - Scale: 74 8 kg (164 lb 12 8 oz) (06/11/23 2326)  SpO2: 98 % (06/13/23 1954)  Exam:   Physical Exam  Vitals and nursing note reviewed  Constitutional:       Appearance: He is not ill-appearing or toxic-appearing  HENT:      Head: Normocephalic and atraumatic  Nose: Nose normal  No congestion or rhinorrhea  Eyes:      General: No scleral icterus  Extraocular Movements: Extraocular movements intact  Conjunctiva/sclera: Conjunctivae normal       Pupils: Pupils are equal, round, and reactive to light  Cardiovascular:      Rate and Rhythm: Normal rate and regular rhythm  Pulses: Normal pulses  Heart sounds: Normal heart sounds  Pulmonary:      Effort: Pulmonary effort is normal  No respiratory distress  Breath sounds: Normal breath sounds  No stridor  No wheezing  Abdominal:      General: Bowel sounds are normal       Palpations: Abdomen is soft        " Tenderness: There is no abdominal tenderness  Musculoskeletal:         General: No swelling or tenderness  Normal range of motion  Cervical back: Normal range of motion and neck supple  No rigidity or tenderness  Right lower leg: No edema  Left lower leg: No edema  Skin:     General: Skin is warm and dry  Capillary Refill: Capillary refill takes less than 2 seconds  Findings: No bruising  Neurological:      Mental Status: He is alert and oriented to person, place, and time  GCS: GCS eye subscore is 4  GCS verbal subscore is 5  GCS motor subscore is 6  Cranial Nerves: Cranial nerves 2-12 are intact  Sensory: Sensation is intact  Motor: Motor function is intact  Coordination: Coordination is intact  Gait: Gait is intact  Psychiatric:         Attention and Perception: He does not perceive visual hallucinations  Thought Content: Thought content does not include homicidal or suicidal ideation  Thought content does not include homicidal or suicidal plan  Discussion with Family: N/A    Discharge instructions/Information to patient and family:   See after visit summary for information provided to patient and family  Provisions for Follow-Up Care:  See after visit summary for information related to follow-up care and any pertinent home health orders  Disposition:     Acute Rehab at [facility]    For Discharges to [facility]:   · Not Applicable to this Patient - Not Applicable to this Patient    Planned Readmission: None     Discharge Statement:  I spent 30 minutes discharging the patient  This time was spent on the day of discharge  I had direct contact with the patient on the day of discharge  Greater than 50% of the total time was spent examining patient, answering all patient questions, arranging and discussing plan of care with patient as well as directly providing post-discharge instructions    Additional time then spent on discharge activities  Discharge Medications:  See after visit summary for reconciled discharge medications provided to patient and family        ** Please Note: This note has been constructed using a voice recognition system **

## 2023-06-14 NOTE — CASE MANAGEMENT
Case Management Discharge Planning Note    Patient name Antoine Pruitt  Location 5T DETOX 514/5T DETOX 46* MRN [id number]  : 1965 Date 2023       Current Admission Date: 2023  Current Admission Diagnosis:Alcohol use disorder, severe, dependence ([facility] )   Patient Active Problem List    Diagnosis Date Noted   • Hearing impaired 2023   • Peripheral neuropathy 2023   • Alcohol use disorder, severe, dependence ([facility] ) 2023   • Macrocytosis without anemia 2023      LOS (days): 3  Geometric Mean LOS (GMLOS) (days):   Days to GMLOS:     OBJECTIVE:  Risk of Unplanned Readmission Score: 6 76         Current admission status: Inpatient   Preferred Pharmacy:   PATIENT/FAMILY REPORTS NO PREFERRED PHARMACY  No address on file      Primary Care Provider: No primary care provider on file  Primary Insurance: [employer] PENDING  Secondary Insurance:     DISCHARGE DETAILS: Cm consulted with medical staff and ensured pt ready for discharge to inpatient [facility] rehab  CM met with pt and pt stated he was ready  Pt has no current prescribed medications  Pt will be discharged to inpatient [facility] rehab at [place] [address] with transportation arriving at 900hrs  Discharge planning discussed with[de-identified] patient  Freedom of Choice: Yes                   Contacts  Patient Contacts: [place] [facility]  Relationship to Patient[de-identified] Treatment Provider  Reason/Outcome: Continuity of Care, Discharge Planning              Other Referral/Resources/Interventions Provided:  Referrals Provided[de-identified] Therapist, Support Group, IOP  Post Acute Placement to[de-identified] Substance Abuse Treatment    Would you like to participate in our [address] service program?  : Patient would like more information          Transport at Discharge : Auto with designated         Transported by [employer] and Unit #):  [facility]                          Family notified[de-identified] Atul Harvey (friend)

## 2023-06-17 ENCOUNTER — APPOINTMENT (OUTPATIENT)
Dept: MRI IMAGING | Facility: HOSPITAL | Age: 58
DRG: 351 | End: 2023-06-17
Payer: COMMERCIAL

## 2023-06-17 ENCOUNTER — HOSPITAL ENCOUNTER (INPATIENT)
Facility: HOSPITAL | Age: 58
LOS: 3 days | Discharge: OTHER INSTITUTION | DRG: 351 | End: 2023-06-22
Attending: EMERGENCY MEDICINE | Admitting: STUDENT IN AN ORGANIZED HEALTH CARE EDUCATION/TRAINING PROGRAM
Payer: COMMERCIAL

## 2023-06-17 ENCOUNTER — APPOINTMENT (EMERGENCY)
Dept: RADIOLOGY | Facility: HOSPITAL | Age: 58
DRG: 351 | End: 2023-06-17
Payer: COMMERCIAL

## 2023-06-17 DIAGNOSIS — M12.879: Primary | ICD-10-CM

## 2023-06-17 DIAGNOSIS — M00.9 SEPTIC JOINT (HCC): ICD-10-CM

## 2023-06-17 DIAGNOSIS — L08.9 RIGHT FOOT INFECTION: ICD-10-CM

## 2023-06-17 DIAGNOSIS — L03.032 CELLULITIS OF GREAT TOE, LEFT: ICD-10-CM

## 2023-06-17 PROBLEM — M79.89 SWELLING OF RIGHT FOOT: Status: ACTIVE | Noted: 2023-06-17

## 2023-06-17 LAB
ANION GAP SERPL CALCULATED.3IONS-SCNC: 7 MMOL/L (ref 4–13)
BASOPHILS # BLD AUTO: 0.04 THOUSANDS/ÂΜL (ref 0–0.1)
BASOPHILS NFR BLD AUTO: 1 % (ref 0–1)
BUN SERPL-MCNC: 10 MG/DL (ref 5–25)
CALCIUM SERPL-MCNC: 9.2 MG/DL (ref 8.4–10.2)
CHLORIDE SERPL-SCNC: 100 MMOL/L (ref 96–108)
CO2 SERPL-SCNC: 29 MMOL/L (ref 21–32)
COLOR FLD: NORMAL
CREAT SERPL-MCNC: 0.94 MG/DL (ref 0.6–1.3)
CRP SERPL QL: 71.5 MG/L
EOSINOPHIL # BLD AUTO: 0.1 THOUSAND/ÂΜL (ref 0–0.61)
EOSINOPHIL NFR BLD AUTO: 1 % (ref 0–6)
ERYTHROCYTE [DISTWIDTH] IN BLOOD BY AUTOMATED COUNT: 11.3 % (ref 11.6–15.1)
ERYTHROCYTE [SEDIMENTATION RATE] IN BLOOD: 35 MM/HOUR (ref 0–19)
GFR SERPL CREATININE-BSD FRML MDRD: 89 ML/MIN/1.73SQ M
GLUCOSE SERPL-MCNC: 100 MG/DL (ref 65–140)
HCT VFR BLD AUTO: 38.8 % (ref 36.5–49.3)
HGB BLD-MCNC: 12.7 G/DL (ref 12–17)
IMM GRANULOCYTES # BLD AUTO: 0.03 THOUSAND/UL (ref 0–0.2)
IMM GRANULOCYTES NFR BLD AUTO: 0 % (ref 0–2)
LYMPHOCYTES # BLD AUTO: 0.89 THOUSANDS/ÂΜL (ref 0.6–4.47)
LYMPHOCYTES NFR BLD AUTO: 11 % (ref 14–44)
MCH RBC QN AUTO: 33.6 PG (ref 26.8–34.3)
MCHC RBC AUTO-ENTMCNC: 32.7 G/DL (ref 31.4–37.4)
MCV RBC AUTO: 103 FL (ref 82–98)
MONOCYTES # BLD AUTO: 0.8 THOUSAND/ÂΜL (ref 0.17–1.22)
MONOCYTES NFR BLD AUTO: 10 % (ref 4–12)
MONOCYTES NFR SNV MANUAL: 2 %
NEUTROPHILS # BLD AUTO: 6.06 THOUSANDS/ÂΜL (ref 1.85–7.62)
NEUTROPHILS NFR SNV MANUAL: 98 %
NEUTS SEG NFR BLD AUTO: 77 % (ref 43–75)
NRBC BLD AUTO-RTO: 0 /100 WBCS
PLATELET # BLD AUTO: 175 THOUSANDS/UL (ref 149–390)
PMV BLD AUTO: 8.9 FL (ref 8.9–12.7)
POTASSIUM SERPL-SCNC: 4.4 MMOL/L (ref 3.5–5.3)
RBC # BLD AUTO: 3.78 MILLION/UL (ref 3.88–5.62)
SITE: NORMAL
SODIUM SERPL-SCNC: 136 MMOL/L (ref 135–147)
TOTAL CELLS COUNTED SPEC: 100
URATE SERPL-MCNC: 5.8 MG/DL (ref 3.5–8.5)
WBC # BLD AUTO: 7.92 THOUSAND/UL (ref 4.31–10.16)
WBC # FLD MANUAL: NORMAL /UL

## 2023-06-17 PROCEDURE — 96365 THER/PROPH/DIAG IV INF INIT: CPT

## 2023-06-17 PROCEDURE — 80048 BASIC METABOLIC PNL TOTAL CA: CPT | Performed by: PHYSICIAN ASSISTANT

## 2023-06-17 PROCEDURE — 87154 CUL TYP ID BLD PTHGN 6+ TRGT: CPT | Performed by: PHYSICIAN ASSISTANT

## 2023-06-17 PROCEDURE — 86618 LYME DISEASE ANTIBODY: CPT | Performed by: PHYSICIAN ASSISTANT

## 2023-06-17 PROCEDURE — 99223 1ST HOSP IP/OBS HIGH 75: CPT | Performed by: STUDENT IN AN ORGANIZED HEALTH CARE EDUCATION/TRAINING PROGRAM

## 2023-06-17 PROCEDURE — 73720 MRI LWR EXTREMITY W/O&W/DYE: CPT

## 2023-06-17 PROCEDURE — 85025 COMPLETE CBC W/AUTO DIFF WBC: CPT | Performed by: PHYSICIAN ASSISTANT

## 2023-06-17 PROCEDURE — 0S9Q3ZZ DRAINAGE OF LEFT TOE PHALANGEAL JOINT, PERCUTANEOUS APPROACH: ICD-10-PCS | Performed by: INTERNAL MEDICINE

## 2023-06-17 PROCEDURE — 85652 RBC SED RATE AUTOMATED: CPT | Performed by: PHYSICIAN ASSISTANT

## 2023-06-17 PROCEDURE — 87205 SMEAR GRAM STAIN: CPT | Performed by: PHYSICIAN ASSISTANT

## 2023-06-17 PROCEDURE — 89051 BODY FLUID CELL COUNT: CPT | Performed by: PHYSICIAN ASSISTANT

## 2023-06-17 PROCEDURE — 86430 RHEUMATOID FACTOR TEST QUAL: CPT | Performed by: PHYSICIAN ASSISTANT

## 2023-06-17 PROCEDURE — 87070 CULTURE OTHR SPECIMN AEROBIC: CPT | Performed by: PHYSICIAN ASSISTANT

## 2023-06-17 PROCEDURE — 73660 X-RAY EXAM OF TOE(S): CPT

## 2023-06-17 PROCEDURE — 87040 BLOOD CULTURE FOR BACTERIA: CPT | Performed by: PHYSICIAN ASSISTANT

## 2023-06-17 PROCEDURE — 84550 ASSAY OF BLOOD/URIC ACID: CPT | Performed by: PHYSICIAN ASSISTANT

## 2023-06-17 PROCEDURE — A9585 GADOBUTROL INJECTION: HCPCS | Performed by: PHYSICIAN ASSISTANT

## 2023-06-17 PROCEDURE — 36415 COLL VENOUS BLD VENIPUNCTURE: CPT | Performed by: PHYSICIAN ASSISTANT

## 2023-06-17 PROCEDURE — 86140 C-REACTIVE PROTEIN: CPT | Performed by: PHYSICIAN ASSISTANT

## 2023-06-17 PROCEDURE — 99284 EMERGENCY DEPT VISIT MOD MDM: CPT

## 2023-06-17 RX ORDER — ACETAMINOPHEN 325 MG/1
650 TABLET ORAL EVERY 6 HOURS PRN
Status: DISCONTINUED | OUTPATIENT
Start: 2023-06-17 | End: 2023-06-22 | Stop reason: HOSPADM

## 2023-06-17 RX ORDER — GABAPENTIN 100 MG/1
100 CAPSULE ORAL
Status: DISCONTINUED | OUTPATIENT
Start: 2023-06-17 | End: 2023-06-17

## 2023-06-17 RX ORDER — ALLOPURINOL 100 MG/1
100 TABLET ORAL ONCE
Status: COMPLETED | OUTPATIENT
Start: 2023-06-17 | End: 2023-06-17

## 2023-06-17 RX ORDER — GABAPENTIN 100 MG/1
100 CAPSULE ORAL
Status: DISCONTINUED | OUTPATIENT
Start: 2023-06-17 | End: 2023-06-22 | Stop reason: HOSPADM

## 2023-06-17 RX ORDER — CEFTRIAXONE 1 G/50ML
1000 INJECTION, SOLUTION INTRAVENOUS EVERY 24 HOURS
Status: DISCONTINUED | OUTPATIENT
Start: 2023-06-17 | End: 2023-06-21

## 2023-06-17 RX ORDER — LIDOCAINE HYDROCHLORIDE 10 MG/ML
10 INJECTION, SOLUTION EPIDURAL; INFILTRATION; INTRACAUDAL; PERINEURAL ONCE
Status: COMPLETED | OUTPATIENT
Start: 2023-06-17 | End: 2023-06-17

## 2023-06-17 RX ORDER — DIPHENHYDRAMINE HCL 25 MG
25 TABLET ORAL EVERY 6 HOURS PRN
Status: DISCONTINUED | OUTPATIENT
Start: 2023-06-17 | End: 2023-06-22 | Stop reason: HOSPADM

## 2023-06-17 RX ORDER — FOLIC ACID 1 MG/1
1 TABLET ORAL DAILY
Status: DISCONTINUED | OUTPATIENT
Start: 2023-06-18 | End: 2023-06-22 | Stop reason: HOSPADM

## 2023-06-17 RX ORDER — LANOLIN ALCOHOL/MO/W.PET/CERES
100 CREAM (GRAM) TOPICAL DAILY
Status: DISCONTINUED | OUTPATIENT
Start: 2023-06-18 | End: 2023-06-22 | Stop reason: HOSPADM

## 2023-06-17 RX ORDER — COLCHICINE 0.6 MG/1
1.2 TABLET ORAL ONCE
Status: COMPLETED | OUTPATIENT
Start: 2023-06-17 | End: 2023-06-17

## 2023-06-17 RX ORDER — ENOXAPARIN SODIUM 100 MG/ML
40 INJECTION SUBCUTANEOUS DAILY
Status: DISCONTINUED | OUTPATIENT
Start: 2023-06-17 | End: 2023-06-22 | Stop reason: HOSPADM

## 2023-06-17 RX ADMIN — ALLOPURINOL 100 MG: 100 TABLET ORAL at 12:56

## 2023-06-17 RX ADMIN — GADOBUTROL 7 ML: 604.72 INJECTION INTRAVENOUS at 18:15

## 2023-06-17 RX ADMIN — DOXYCYCLINE 100 MG: 100 INJECTION, POWDER, LYOPHILIZED, FOR SOLUTION INTRAVENOUS at 12:57

## 2023-06-17 RX ADMIN — LIDOCAINE HYDROCHLORIDE 10 ML: 10 INJECTION, SOLUTION EPIDURAL; INFILTRATION; INTRACAUDAL; PERINEURAL at 11:37

## 2023-06-17 RX ADMIN — COLCHICINE 1.2 MG: 0.6 TABLET ORAL at 12:56

## 2023-06-17 RX ADMIN — ENOXAPARIN SODIUM 40 MG: 100 INJECTION SUBCUTANEOUS at 15:38

## 2023-06-17 RX ADMIN — CEFTRIAXONE 1000 MG: 1 INJECTION, SOLUTION INTRAVENOUS at 15:38

## 2023-06-17 RX ADMIN — VANCOMYCIN HYDROCHLORIDE 1750 MG: 5 INJECTION, POWDER, LYOPHILIZED, FOR SOLUTION INTRAVENOUS at 16:20

## 2023-06-17 RX ADMIN — ACETAMINOPHEN 650 MG: 325 TABLET, FILM COATED ORAL at 19:34

## 2023-06-17 NOTE — PLAN OF CARE
Problem: PAIN - ADULT  Goal: Verbalizes/displays adequate comfort level or baseline comfort level  Description: Interventions:  - Encourage patient to monitor pain and request assistance  - Assess pain using appropriate pain scale  - Administer analgesics based on type and severity of pain and evaluate response  - Implement non-pharmacological measures as appropriate and evaluate response  - Consider cultural and social influences on pain and pain management  - Notify physician/advanced practitioner if interventions unsuccessful or patient reports new pain  Outcome: Progressing     Problem: INFECTION - ADULT  Goal: Absence or prevention of progression during hospitalization  Description: INTERVENTIONS:  - Assess and monitor for signs and symptoms of infection  - Monitor lab/diagnostic results  - Monitor all insertion sites, i e  indwelling lines, tubes, and drains  - Monitor endotracheal if appropriate and nasal secretions for changes in amount and color  - [place] appropriate cooling/warming therapies per order  - Administer medications as ordered  - Instruct and encourage patient and family to use good hand hygiene technique  - Identify and instruct in appropriate isolation precautions for identified infection/condition  Outcome: Progressing     Problem: SAFETY ADULT  Goal: Patient will remain free of falls  Description: INTERVENTIONS:  - Educate patient/family on patient safety including physical limitations  - Instruct patient to call for assistance with activity   - Consult OT/PT to assist with strengthening/mobility   - Keep Call bell within reach  - Keep bed low and locked with side rails adjusted as appropriate  - Keep care items and personal belongings within reach  - Initiate and maintain comfort rounds  - Make Fall Risk Sign visible to staff  - Offer Toileting every 2 Hours, in advance of need  - Initiate/Maintain alarm  - Obtain necessary fall risk management equipment  - Apply yellow socks and bracelet for high fall risk patients  - Consider moving patient to room near nurses station  Outcome: Progressing  Goal: Maintain or return to baseline ADL function  Description: INTERVENTIONS:  -  Assess patient's ability to carry out ADLs; assess patient's baseline for ADL function and identify physical deficits which impact ability to perform ADLs (bathing, care of mouth/teeth, toileting, grooming, dressing, etc )  - Assess/evaluate cause of self-care deficits   - Assess range of motion  - Assess patient's mobility; develop plan if impaired  - Assess patient's need for assistive devices and provide as appropriate  - Encourage maximum independence but intervene and supervise when necessary  - Involve family in performance of ADLs  - Assess for home care needs following discharge   - Consider OT consult to assist with ADL evaluation and planning for discharge  - Provide patient education as appropriate  Outcome: Progressing  Goal: Maintains/Returns to pre admission functional level  Description: INTERVENTIONS:  - Perform BMAT or MOVE assessment daily    - Set and communicate daily mobility goal to care team and patient/family/caregiver  - Collaborate with rehabilitation services on mobility goals if consulted  - Perform Range of Motion 3 times a day  - Reposition patient every 3 hours    - Dangle patient 3 times a day  - Stand patient 3 times a day  - Ambulate patient 3 times a day  - Out of bed to chair 3 times a day   - Out of bed for meals 3 times a day  - Out of bed for toileting  - Record patient progress and toleration of activity level   Outcome: Progressing     Problem: DISCHARGE PLANNING  Goal: Discharge to home or other facility with appropriate resources  Description: INTERVENTIONS:  - Identify barriers to discharge w/patient and caregiver  - Arrange for needed discharge resources and transportation as appropriate  - Identify discharge learning needs (meds, wound care, etc )  - Arrange for interpretive services to assist at discharge as needed  - Refer to Case Management Department for coordinating discharge planning if the patient needs post-hospital services based on physician/advanced practitioner order or complex needs related to functional status, cognitive ability, or social support system  Outcome: Progressing     Problem: Knowledge Deficit  Goal: Patient/family/caregiver demonstrates understanding of disease process, treatment plan, medications, and discharge instructions  Description: Complete learning assessment and assess knowledge base    Interventions:  - Provide teaching at level of understanding  - Provide teaching via preferred learning methods  Outcome: Progressing

## 2023-06-17 NOTE — ASSESSMENT & PLAN NOTE
· Notes recent alcohol abuse history upwards of 6 beers daily  · Was discharged from [address] [place] [address] unit on 6/14  · Denies recent alcohol use since discharge -had been discharged to inpatient rehab at [facility]  · Thiamine, folic acid

## 2023-06-17 NOTE — PROGRESS NOTES
Savana Tinajero is a 62 y o  male who is currently ordered Vancomycin IV with management by the Pharmacy Consult service  Relevant clinical data and objective / subjective history reviewed  Vancomycin Assessment:  Indication and Goal AUC/Trough: Bone/joint infection (goal -600, trough >10), -600, trough >10  Clinical Status: stable  Micro:     Renal Function:  SCr: 0 94 mg/dL  CrCl: 82 8 mL/min  Renal replacement: Not on dialysis  Days of Therapy: 1  Current Dose: 1750mg IV loading dose once  Vancomycin Plan:  New Dosinmg IV Q12H  Estimated AUC: 414 mcg*hr/mL  Estimated Trough: 13 2 mcg/mL  Next Level: With morning labs at approximately 0500 on 23  Renal Function Monitoring: Daily BMP and [place] will continue to follow closely for s/sx of nephrotoxicity, infusion reactions and appropriateness of therapy  BMP and CBC will be ordered per protocol  We will continue to follow the patient’s culture results and clinical progress daily      Boris Duran, Pharmacist

## 2023-06-17 NOTE — ASSESSMENT & PLAN NOTE
· History of peripheral neuropathy in bilateral lower extremities  · Ambulates with cane at baseline  · Fall precautions

## 2023-06-17 NOTE — ED PROVIDER NOTES
History  Chief Complaint   Patient presents with   • Foot Pain     Patient complaint of foot pain and swelling x 2 days      HPI     Left-sided foot pain and swelling x3 days as well as on the R foot  No fevers or chills  No IVDA  Hx chronic alcoholism  Presents from [facility] for evaluation for possible infection  None       Past Medical History:   Diagnosis Date   • Alcohol withdrawal syndrome with complication ([facility] [address] ) 2/26/1581       History reviewed  No pertinent surgical history  History reviewed  No pertinent family history  I have reviewed and agree with the history as documented  E-Cigarette/Vaping   • E-Cigarette Use Never User      E-Cigarette/Vaping Substances     Social History     Tobacco Use   • Smoking status: Never   • Smokeless tobacco: Never   Vaping Use   • Vaping Use: Never used   Substance Use Topics   • Alcohol use: Yes     Alcohol/week: 42 0 standard drinks of alcohol     Types: 42 Cans of beer per week     Comment: drinks 6 pack or IPA (7% alcohol each 12 oz can)  And maybe a pint of beer in the bar   • Drug use: Never       Review of Systems   Constitutional: Negative for chills and fever  HENT: Negative for ear pain and sore throat  Eyes: Negative for pain and visual disturbance  Respiratory: Negative for cough and shortness of breath  Cardiovascular: Negative for chest pain and palpitations  Gastrointestinal: Negative for abdominal pain and vomiting  Genitourinary: Negative for dysuria and hematuria  Musculoskeletal: Positive for joint swelling (and pain with redness)  Negative for arthralgias and back pain  Skin: Negative for color change and rash  Neurological: Negative for seizures and syncope  All other systems reviewed and are negative  Physical Exam  Physical Exam  Vitals and nursing note reviewed  Constitutional:       General: He is not in acute distress  Appearance: He is well-developed     HENT:      Head: Normocephalic and atraumatic  Eyes:      Conjunctiva/sclera: Conjunctivae normal    Cardiovascular:      Rate and Rhythm: Normal rate and regular rhythm  Heart sounds: No murmur heard  Pulmonary:      Effort: Pulmonary effort is normal  No respiratory distress  Breath sounds: Normal breath sounds  Abdominal:      Palpations: Abdomen is soft  Tenderness: There is no abdominal tenderness  Musculoskeletal:         General: No swelling  Cervical back: Neck supple  Left foot: Decreased range of motion and decreased capillary refill  Prominent metatarsal heads and tenderness present  No swelling or bony tenderness  Normal pulse  Comments: B/L R and L great toe joint swelling at MTP R>L  B/L Non mobile joints d/t pain w/ R great to MTP joint effusion and joint erythema   Skin:     General: Skin is warm and dry  Capillary Refill: Capillary refill takes less than 2 seconds  Neurological:      Mental Status: He is alert     Psychiatric:         Mood and Affect: Mood normal                  Vital Signs  ED Triage Vitals   Temperature Pulse Respirations Blood Pressure SpO2   06/17/23 0922 06/17/23 0922 06/17/23 0922 06/17/23 0922 06/17/23 0922   98 3 °F (36 8 °C) 96 18 118/75 99 %      Temp Source Heart Rate Source Patient Position - Orthostatic VS BP Location FiO2 (%)   06/17/23 1508 06/17/23 0930 06/17/23 1130 06/17/23 1030 --   Temporal Monitor Sitting Right arm       Pain Score       06/17/23 1130       No Pain           Vitals:    06/17/23 1931 06/17/23 2020 06/17/23 2352 06/18/23 0500   BP: 150/93 126/85 108/77 124/81   Pulse: 101 105 88 85   Patient Position - Orthostatic VS:             Visual Acuity      ED Medications  Medications   cefTRIAXone (ROCEPHIN) IVPB (premix in dextrose) 1,000 mg 50 mL (1,000 mg Intravenous New Bag 6/17/23 1538)   enoxaparin (LOVENOX) subcutaneous injection 40 mg (40 mg Subcutaneous Given 6/17/23 1538)   acetaminophen (TYLENOL) tablet 650 mg (650 mg Oral Given 6/18/23 0502)   thiamine tablet 100 mg (has no administration in time range)   folic acid (FOLVITE) tablet 1 mg (has no administration in time range)   multivitamin-minerals (CENTRUM) tablet 1 tablet (has no administration in time range)   diphenhydrAMINE (BENADRYL) tablet 25 mg (has no administration in time range)   gabapentin (NEURONTIN) capsule 100 mg (has no administration in time range)   vancomycin (VANCOCIN) IVPB (premix in dextrose) 750 mg 150 mL (750 mg Intravenous New Bag 6/18/23 0456)   lidocaine (PF) (XYLOCAINE-MPF) 1 % injection 10 mL (10 mL Infiltration Given by Other 6/17/23 1137)   doxycycline (VIBRAMYCIN) 100 mg in sodium chloride 0 9 % 100 mL IVPB (0 mg Intravenous Stopped 6/17/23 1357)   colchicine (COLCRYS) tablet 1 2 mg (1 2 mg Oral Given 6/17/23 1256)   allopurinol (ZYLOPRIM) tablet 100 mg (100 mg Oral Given 6/17/23 1256)   vancomycin (VANCOCIN) 1,750 mg in sodium chloride 0 9 % 500 mL IVPB (1,750 mg Intravenous New Bag 6/17/23 1620)   Gadobutrol injection (SINGLE-DOSE) SOLN 7 mL (7 mL Intravenous Given 6/17/23 1815)       Diagnostic Studies  Results Reviewed     Procedure Component Value Units Date/Time    Basic metabolic panel [[id number]] Collected: 06/18/23 0456    Lab Status: No result Specimen: Blood from Arm, Left     CBC and differential [[id number]] Collected: 06/18/23 0456    Lab Status: No result Specimen: Blood from Arm, Left     Blood culture #1 [[id number]] Collected: 06/17/23 1408    Lab Status: Preliminary result Specimen: Blood from Arm, Right Updated: 06/18/23 0001     Blood Culture Received in Microbiology Lab  Culture in Progress  Blood culture #2 [[id number]] Collected: 06/17/23 1408    Lab Status: Preliminary result Specimen: Blood from Arm, Left Updated: 06/18/23 0001     Blood Culture Received in Microbiology Lab  Culture in Progress      Synovial fluid white cell count w/ diff [[id number]] Resulted: 06/17/23 2002    Lab Status: Final result Specimen: Synovial Fluid Updated: 06/17/23 2002     Site SYNOVIAL     Color, Fluid Bloody     WBC, Fluid 12,645 /ul     Body fluid culture and Gram stain [[id number]] Collected: 06/17/23 1140    Lab Status: Preliminary result Specimen: Body Fluid from Other Updated: 06/17/23 1825     Gram Stain Result No Polys or Bacteria seen    Rheumatoid factor screen [[id number]] Collected: 06/17/23 1255    Lab Status:  In process Specimen: Blood from Arm, Right Updated: 06/17/23 1317    Synovial fluid, crystal [[id number]]     Lab Status: No result Specimen: Synovial Fluid     Sedimentation rate, automated [[id number]]  (Abnormal) Collected: 06/17/23 0944    Lab Status: Final result Specimen: Blood from Arm, Right Updated: 06/17/23 1106     Sed Rate 35 mm/hour     Basic metabolic panel [[id number]] Collected: 06/17/23 0944    Lab Status: Final result Specimen: Blood from Arm, Right Updated: 06/17/23 1012     Sodium 136 mmol/L      Potassium 4 4 mmol/L      Chloride 100 mmol/L      CO2 29 mmol/L      ANION GAP 7 mmol/L      BUN 10 mg/dL      Creatinine 0 94 mg/dL      Glucose 100 mg/dL      Calcium 9 2 mg/dL      eGFR 89 ml/min/1 73sq m     Narrative:      [place] guidelines for Chronic Kidney Disease (CKD):   •  Stage 1 with normal or high GFR (GFR > 90 mL/min/1 73 square meters)  •  Stage 2 Mild CKD (GFR = 60-89 mL/min/1 73 square meters)  •  Stage 3A Moderate CKD (GFR = 45-59 mL/min/1 73 square meters)  •  Stage 3B Moderate CKD (GFR = 30-44 mL/min/1 73 square meters)  •  Stage 4 Severe CKD (GFR = 15-29 mL/min/1 73 square meters)  •  Stage 5 End Stage CKD (GFR <15 mL/min/1 73 square meters)  Note: GFR calculation is accurate only with a steady state creatinine    C-reactive protein [[id number]]  (Abnormal) Collected: 06/17/23 0944    Lab Status: Final result Specimen: Blood from Arm, Right Updated: 06/17/23 1012     CRP 71 5 mg/L     Uric acid [[id number]]  (Normal) Collected: 06/17/23 0944    Lab Status: Final result Specimen: Blood from Arm, Right Updated: 06/17/23 1012     Uric Acid 5 8 mg/dL     Narrative:      N-acetyl-p-benzoquinone imine (metabolite of Acetaminophen) will generate erroneously low results in samples for patients that have taken an overdose of Acetaminophen  CBC and differential [[id number]]  (Abnormal) Collected: 06/17/23 0944    Lab Status: Final result Specimen: Blood from Arm, Right Updated: 06/17/23 0952     WBC 7 92 Thousand/uL      RBC 3 78 Million/uL      Hemoglobin 12 7 g/dL      Hematocrit 38 8 %       fL      MCH 33 6 pg      MCHC 32 7 g/dL      RDW 11 3 %      MPV 8 9 fL      Platelets 098 Thousands/uL      nRBC 0 /100 WBCs      Neutrophils Relative 77 %      Immat GRANS % 0 %      Lymphocytes Relative 11 %      Monocytes Relative 10 %      Eosinophils Relative 1 %      Basophils Relative 1 %      Neutrophils Absolute 6 06 Thousands/µL      Immature Grans Absolute 0 03 Thousand/uL      Lymphocytes Absolute 0 89 Thousands/µL      Monocytes Absolute 0 80 Thousand/µL      Eosinophils Absolute 0 10 Thousand/µL      Basophils Absolute 0 04 Thousands/µL     Lyme Antibody Profile with reflex to [facility] [[id number]] Collected: 06/17/23 0944    Lab Status: In process Specimen: Blood from Arm, Right Updated: 06/17/23 0949                 XR toe great min 2 views LEFT   Final Result by Chuyita Davis MD (06/17 2315)      No acute osseous abnormality  Workstation performed: [id number]         XR toe great min 2 views RIGHT   Final Result by Chuyita Davis MD (06/17 2319)         1  No acute osseous abnormality  2  MRI would be more sensitive for osteomyelitis  One has been performed and will be reported separately              Workstation performed: [id number]         MRI foot/forefoot toes right w wo contrast    (Results Pending)              Procedures  Arthrocentesis    Date/Time: 6/17/2023 11:00 AM    Performed by: Timo Wilkins PA-C  Authorized by: Timo Wlikins CJ  Universal Protocol:  Consent: Verbal consent obtained  Written consent obtained  Consent given by: patient  Patient understanding: patient states understanding of the procedure being performed  Patient consent: the patient's understanding of the procedure matches consent given  Patient identity confirmed: verbally with patient, hospital-assigned identification number and provided demographic data      Location:  Bedside and ED  Indications:  Joint swelling, pain, possible septic joint and diagnostic evaluation  Body area: Toe  Location details:  Right big toe  Local anesthesia used?: Yes    Anesthesia:  Digital block and local infiltration  Local anesthetic:  Lidocaine 1% without epinephrine  Anesthetic total (ml):  6  Preparation: Patient was prepped and draped in usual sterile fashion    Needle size:  22 G  Ultrasound guidance: Yes    Approach:  Medial  Aspirate:  Cloudy, blood-tinged and yellow   Aspirated 3 ml from toe joint and sent to lab  ED Course  ED Course as of 06/18/23 0717   Sat Jun 17, 2023   1034 Pending ESR / Lyme AB   1041 Pt defers on arthrocentesis   1103 Paged podiatry   [id number] Finally decides for joint arthrocentesis  1152 Joint aspiration complete   1231 Lab states only sent specimen in sterile container for culture and deferred White cell count and fluid crystal analysis  Laboratory calling [facility] to find out if they can utilize the 1 specimen for all 3 tests  Laboratory to contact me back  [address] > for fluid sampling ETA > they do not have yet   1323 Called for adm on basis of possible septic joiint v  Lyme arthritis v  Reactive arthropathy v  Gout w/ significant risk for loss if septic joint  SBIRT 20yo+    Flowsheet Row Most Recent Value   Initial Alcohol Screen: US AUDIT-C     1  How often do you have a drink containing alcohol? 6 Filed at: 06/17/2023 0933   2   How many drinks containing alcohol do you have on a typical day you are drinking? 0 Filed at: 06/17/2023 0933   3a  Male UNDER 65: How often do you have five or more drinks on one occasion? 0 Filed at: 06/17/2023 0933   3b  FEMALE Any Age, or MALE 65+: How often do you have 4 or more drinks on one occassion? 0 Filed at: 06/17/2023 0933   Audit-C Score 6 Filed at: 06/17/2023 0457   ADALID: How many times in the past year have you    Used an illegal drug or used a prescription medication for non-medical reasons? Never Filed at: 06/17/2023 1390                    Medical Decision Making  B/L great toe arthropathy likely gout / consideration for lyme v  Rheumatic d/z  R/o septic arthirits  Labs unable to be run due to lab send out to [facility] which will have to be sent to [facility] upon discussion w/ lab  Unable to send paitent home in setting of possible arthropathy  Concern for septic arthropathy and plan for admission for IV abx  Also pending lyme titers  Uric acid wnl  RF factors sent  Cellulitis of great toe, left: acute illness or injury  Transient arthropathy of metatarsophalangeal (MTP) joint of great toe: acute illness or injury  Amount and/or Complexity of Data Reviewed  Labs: ordered  Risk  Prescription drug management  Decision regarding hospitalization            Disposition  Final diagnoses:   Transient arthropathy of metatarsophalangeal (MTP) joint of great toe   Cellulitis of great toe, left     Time reflects when diagnosis was documented in both MDM as applicable and the Disposition within this note     Time User Action Codes Description Comment    6/17/2023 11:23 AM Sarah Hanks Add [T04 413] Transient arthropathy of metatarsophalangeal (MTP) joint of great toe     6/17/2023  1:52 PM Sarah Hanks Add [L03 032] Cellulitis of great toe, left     6/17/2023  1:58 PM Caitlin Marques Add [M00 9] Septic joint ([address] )     6/17/2023  2:28 PM Caitlin Marques Add [L08 9] Right foot infection       ED Disposition     ED Disposition   Admit Condition   Stable    Date/Time   Sat Jun 17, 2023  1:52 PM    Comment   Case was discussed with Landon Benitez and the patient's admission status was agreed to be Admission Status: observation status to the service of Dr Clayton Hull   Follow-up Information    None         There are no discharge medications for this patient  No discharge procedures on file      PDMP Review       Value Time User    PDMP Reviewed  Yes 6/17/2023  2:33 PM Nader Domingo PA-C          ED Provider  Electronically Signed by           Patito Cobian PA-C  06/18/23 8122

## 2023-06-17 NOTE — H&P
[place]  H&P  Name: Patric Salmeron 62 y o  male I MRN: [id number]  Unit/Bed#: [facility] I Date of Admission: 6/17/2023   Date of Service: 6/17/2023 I Hospital Day: 0      Assessment/Plan   * Swelling of right foot  Assessment & Plan  · Presented to the emergency department due to right foot erythema, swelling and discomfort x2 days  · S/p arthrocentesis, fluid studies pending  · Does have significant alcohol use history - ? Gout however uric acid was normal  · Received allopurinol and colchicine in the ED  · Lyme titer, rheumatoid factor obtained in the ED and pending  · Blood cultures pending x2  · Does not meet any SIRS criteria  · ESR 35 CRP 71 5  · Received IV doxycycline in the ED  · Will continue with IV rocephin/vancomycin for now pending fluid studies  · MRI ordered  · Trend WBC and fever curve  · Consult podiatry    Peripheral neuropathy  Assessment & Plan  · History of peripheral neuropathy in bilateral lower extremities  · Ambulates with cane at baseline  · Fall precautions    Alcohol use disorder, severe, dependence ([facility] [address] )  Assessment & Plan  · Notes recent alcohol abuse history upwards of 6 beers daily  · Was discharged from [address] [place] [address] detox unit on 6/14  · Denies recent alcohol use since discharge -had been discharged to inpatient rehab at [facility]  · Thiamine, folic acid      VTE Pharmacologic Prophylaxis: VTE Score: 3 Moderate Risk (Score 3-4) - Pharmacological DVT Prophylaxis Ordered: enoxaparin (Lovenox)  Code Status: Level 1 - Full Code   Discussion with family: Patient declined call to   Anticipated Length of Stay: Patient will be admitted on an observation basis with an anticipated length of stay of less than 2 midnights secondary to Rule out septic joint      Total Time Spent on Date of Encounter in care of patient: 75 minutes This time was spent on one or more of the following: performing physical exam; counseling and coordination of care; obtaining or reviewing history; documenting in the medical record; reviewing/ordering tests, medications or procedures; communicating with other healthcare professionals and discussing with patient's family/caregivers  Chief Complaint: Right foot pain and swelling    History of Present Illness:  Kai Calabrese is a 62 y o  male with a PMH of alcohol abuse, peripheral neuropathy who presents with right foot pain and swelling  Patient presented to the emergency department with right foot swelling, erythema and pain near the right great toe x2 days  Patient denies chest pain/palpitations, shortness of breath, nausea/vomiting, abdominal pain, fever/chills  Reports that he was recently discharged from [address] [place] [address] unit on 6/14 to inpatient rehab at [facility]  He has not had any alcohol since discharge  Denies history of gout  Denies any trauma  Review of Systems:  Review of Systems   Constitutional: Negative for chills, fatigue, fever and unexpected weight change  HENT: Negative for congestion, sore throat and trouble swallowing  Eyes: Negative for photophobia, pain and visual disturbance  Respiratory: Negative for cough, shortness of breath and wheezing  Cardiovascular: Negative for chest pain, palpitations and leg swelling  Gastrointestinal: Negative for abdominal pain, constipation, diarrhea, nausea and vomiting  Endocrine: Negative for polyuria  Genitourinary: Negative for difficulty urinating, dysuria, flank pain, hematuria and urgency  Musculoskeletal: Positive for joint swelling  Negative for back pain, myalgias, neck pain and neck stiffness  Skin: Positive for color change  Negative for pallor and rash  Neurological: Negative for dizziness, tremors, syncope, speech difficulty, weakness, light-headedness and headaches  Hematological: Does not bruise/bleed easily  Psychiatric/Behavioral: Negative for agitation and confusion         Past Medical and Surgical History:   Past Medical History:   Diagnosis Date   • Alcohol withdrawal syndrome with complication ([facility] [address] ) 8/37/8624       History reviewed  No pertinent surgical history  Meds/Allergies:  Prior to Admission medications    Not on File     I have reviewed home medications with patient personally  Allergies: Allergies   Allergen Reactions   • Penicillins Hives   • Sulfa Antibiotics Other (See Comments)     hives       Social History:  Marital Status:    Occupation:   Patient Pre-hospital Living Situation: Home  Patient Pre-hospital Level of Mobility: unable to be assessed at time of evaluation  Patient Pre-hospital Diet Restrictions:   Substance Use History:   Social History     Substance and Sexual Activity   Alcohol Use Yes   • Alcohol/week: 42 0 standard drinks of alcohol   • Types: 42 Cans of beer per week    Comment: drinks 6 pack or IPA (7% alcohol each 12 oz can)  And maybe a pint of beer in the bar     Social History     Tobacco Use   Smoking Status Never   Smokeless Tobacco Never     Social History     Substance and Sexual Activity   Drug Use Never       Family History:  History reviewed  No pertinent family history  Physical Exam:     Vitals:   Blood Pressure: 120/72 (06/17/23 1430)  Pulse: 75 (06/17/23 1430)  Temperature: 98 3 °F (36 8 °C) (06/17/23 0922)  Respirations: 18 (06/17/23 1430)  Weight - Scale: 74 8 kg (165 lb) (06/17/23 0922)  SpO2: 99 % (06/17/23 1430)    Physical Exam  Vitals and nursing note reviewed  Constitutional:       General: He is not in acute distress  Appearance: He is well-developed  Comments: Appears comfortable, no acute distress   HENT:      Head: Normocephalic and atraumatic  Eyes:      General: No scleral icterus  Extraocular Movements: Extraocular movements intact  Conjunctiva/sclera: Conjunctivae normal    Cardiovascular:      Rate and Rhythm: Normal rate and regular rhythm  Heart sounds: No murmur heard    Pulmonary: Effort: Pulmonary effort is normal  No respiratory distress  Breath sounds: Normal breath sounds  No wheezing, rhonchi or rales  Abdominal:      General: Bowel sounds are normal       Palpations: Abdomen is soft  Tenderness: There is no abdominal tenderness  There is no guarding or rebound  Musculoskeletal:         General: No swelling  Cervical back: Normal range of motion  Comments: Able to move upper/lower extremities bilaterally  Right foot with erythema, swelling and tender to palpation near the first MTP joint   Skin:     General: Skin is warm and dry  Capillary Refill: Capillary refill takes less than 2 seconds  Findings: Erythema present  Neurological:      Mental Status: He is alert and oriented to person, place, and time     Psychiatric:         Mood and Affect: Mood normal          Speech: Speech normal          Behavior: Behavior normal           Additional Data:     Lab Results:  Results from last 7 days   Lab Units 06/17/23  0944   WBC Thousand/uL 7 92   HEMOGLOBIN g/dL 12 7   HEMATOCRIT % 38 8   PLATELETS Thousands/uL 175   NEUTROS PCT % 77*   LYMPHS PCT % 11*   MONOS PCT % 10   EOS PCT % 1     Results from last 7 days   Lab Units 06/17/23  0944 06/14/23  0517   SODIUM mmol/L 136 135   POTASSIUM mmol/L 4 4 3 3*   CHLORIDE mmol/L 100 99   CO2 mmol/L 29 26   BUN mg/dL 10 6   CREATININE mg/dL 0 94 0 77   ANION GAP mmol/L 7 10   CALCIUM mg/dL 9 2 8 6   ALBUMIN g/dL  --  3 3*   TOTAL BILIRUBIN mg/dL  --  1 36*   ALK PHOS U/L  --  90   ALT U/L  --  17   AST U/L  --  21   GLUCOSE RANDOM mg/dL 100 86     Results from last 7 days   Lab Units 06/11/23  1913   INR  0 94                   Lines/Drains:  Invasive Devices     Peripheral Intravenous Line  Duration           Peripheral IV 06/17/23 Right Antecubital <1 day                    Imaging: Reviewed radiology reports from this admission including: xray(s)  XR toe great min 2 views LEFT    (Results Pending)   XR toe great min 2 views RIGHT    (Results Pending)   MRI inpatient order    (Results Pending)       EKG and Other Studies Reviewed on Admission:   · EKG: NSR  HR 88     ** Please Note: This note has been constructed using a voice recognition system   **

## 2023-06-17 NOTE — ASSESSMENT & PLAN NOTE
· Notes recent alcohol abuse history upwards of 6 beers daily  · Was discharged from [address] [place] [address] on 6/14  · Denies recent alcohol use since discharge -had been discharged to inpatient rehab at [place] [address]  · Thiamine, folic acid

## 2023-06-17 NOTE — PLAN OF CARE
x  Problem: PAIN - ADULT  Goal: Verbalizes/displays adequate comfort level or baseline comfort level  Description: Interventions:  - Encourage patient to monitor pain and request assistance  - Assess pain using appropriate pain scale  - Administer analgesics based on type and severity of pain and evaluate response  - Implement non-pharmacological measures as appropriate and evaluate response  - Consider cultural and social influences on pain and pain management  - Notify physician/advanced practitioner if interventions unsuccessful or patient reports new pain  Outcome: Progressing     Problem: INFECTION - ADULT  Goal: Absence or prevention of progression during hospitalization  Description: INTERVENTIONS:  - Assess and monitor for signs and symptoms of infection  - Monitor lab/diagnostic results  - Monitor all insertion sites, i e  indwelling lines, tubes, and drains  - Monitor endotracheal if appropriate and nasal secretions for changes in amount and color  - [place] appropriate cooling/warming therapies per order  - Administer medications as ordered  - Instruct and encourage patient and family to use good hand hygiene technique  - Identify and instruct in appropriate isolation precautions for identified infection/condition  Outcome: Progressing     Problem: SAFETY ADULT  Goal: Patient will remain free of falls  Description: INTERVENTIONS:  - Educate patient/family on patient safety including physical limitations  - Instruct patient to call for assistance with activity   - Consult OT/PT to assist with strengthening/mobility   - Keep Call bell within reach  - Keep bed low and locked with side rails adjusted as appropriate  - Keep care items and personal belongings within reach  - Initiate and maintain comfort rounds  - Make Fall Risk Sign visible to staff  - Offer Toileting every x Hours, in advance of need  - Initiate/Maintain xalarm  - Obtain necessary fall risk management equipment: x  - Apply yellow socks and bracelet for high fall risk patients  - Consider moving patient to room near nurses station  Outcome: Progressing  Goal: Maintain or return to baseline ADL function  Description: INTERVENTIONS:  -  Assess patient's ability to carry out ADLs; assess patient's baseline for ADL function and identify physical deficits which impact ability to perform ADLs (bathing, care of mouth/teeth, toileting, grooming, dressing, etc )  - Assess/evaluate cause of self-care deficits   - Assess range of motion  - Assess patient's mobility; develop plan if impaired  - Assess patient's need for assistive devices and provide as appropriate  - Encourage maximum independence but intervene and supervise when necessary  - Involve family in performance of ADLs  - Assess for home care needs following discharge   - Consider OT consult to assist with ADL evaluation and planning for discharge  - Provide patient education as appropriate  Outcome: Progressing  Goal: Maintains/Returns to pre admission functional level  Description: INTERVENTIONS:  - Perform BMAT or MOVE assessment daily    - Set and communicate daily mobility goal to care team and patient/family/caregiver  - Collaborate with rehabilitation services on mobility goals if consulted  - Perform Range of Motion x times a day  - Reposition patient every x hours    - Dangle patient x times a day  - Stand patient x times a day  - Ambulate patient x times a day  - Out of bed to chair x times a day   - Out of bed for meals xxxx times a day  - Out of bed for toileting  - Record patient progress and toleration of activity level   Outcome: Progressing     Problem: DISCHARGE PLANNING  Goal: Discharge to home or other facility with appropriate resources  Description: INTERVENTIONS:  - Identify barriers to discharge w/patient and caregiver  - Arrange for needed discharge resources and transportation as appropriate  - Identify discharge learning needs (meds, wound care, etc )  - Arrange for interpretive services to assist at discharge as needed  - Refer to Case Management Department for coordinating discharge planning if the patient needs post-hospital services based on physician/advanced practitioner order or complex needs related to functional status, cognitive ability, or social support system  Outcome: Progressing     Problem: Knowledge Deficit  Goal: Patient/family/caregiver demonstrates understanding of disease process, treatment plan, medications, and discharge instructions  Description: Complete learning assessment and assess knowledge base    Interventions:  - Provide teaching at level of understanding  - Provide teaching via preferred learning methods  Outcome: Progressing

## 2023-06-17 NOTE — ASSESSMENT & PLAN NOTE
· Presented to the emergency department due to right foot erythema, swelling and discomfort x2 days  · XR right foot unremarkable on admission  · S/p arthrocentesis, fluid studies pending  · Does have significant alcohol use history - ?  Gout however uric acid was normal  · Received allopurinol and colchicine in the ED  · Lyme titer, rheumatoid factor obtained in the ED and pending  · Blood cultures pending x2  · Does not meet any SIRS criteria  · ESR 35 CRP 71 5  · Received IV doxycycline in the ED  · Will continue with IV rocephin/vancomycin for now pending fluid studies  · MRI ordered  · Trend WBC and fever curve

## 2023-06-18 LAB
ANION GAP SERPL CALCULATED.3IONS-SCNC: 9 MMOL/L (ref 4–13)
B BURGDOR IGG+IGM SER-ACNC: <0.2 AI
BASOPHILS # BLD AUTO: 0.04 THOUSANDS/ÂΜL (ref 0–0.1)
BASOPHILS NFR BLD AUTO: 1 % (ref 0–1)
BUN SERPL-MCNC: 7 MG/DL (ref 5–25)
CALCIUM SERPL-MCNC: 8.9 MG/DL (ref 8.4–10.2)
CHLORIDE SERPL-SCNC: 103 MMOL/L (ref 96–108)
CO2 SERPL-SCNC: 23 MMOL/L (ref 21–32)
CREAT SERPL-MCNC: 0.71 MG/DL (ref 0.6–1.3)
EOSINOPHIL # BLD AUTO: 0.14 THOUSAND/ÂΜL (ref 0–0.61)
EOSINOPHIL NFR BLD AUTO: 2 % (ref 0–6)
ERYTHROCYTE [DISTWIDTH] IN BLOOD BY AUTOMATED COUNT: 11.4 % (ref 11.6–15.1)
GFR SERPL CREATININE-BSD FRML MDRD: 103 ML/MIN/1.73SQ M
GLUCOSE SERPL-MCNC: 65 MG/DL (ref 65–140)
HCT VFR BLD AUTO: 39.4 % (ref 36.5–49.3)
HGB BLD-MCNC: 12.7 G/DL (ref 12–17)
IMM GRANULOCYTES # BLD AUTO: 0.02 THOUSAND/UL (ref 0–0.2)
IMM GRANULOCYTES NFR BLD AUTO: 0 % (ref 0–2)
LYMPHOCYTES # BLD AUTO: 1.02 THOUSANDS/ÂΜL (ref 0.6–4.47)
LYMPHOCYTES NFR BLD AUTO: 14 % (ref 14–44)
MCH RBC QN AUTO: 33.4 PG (ref 26.8–34.3)
MCHC RBC AUTO-ENTMCNC: 32.2 G/DL (ref 31.4–37.4)
MCV RBC AUTO: 104 FL (ref 82–98)
MONOCYTES # BLD AUTO: 0.83 THOUSAND/ÂΜL (ref 0.17–1.22)
MONOCYTES NFR BLD AUTO: 11 % (ref 4–12)
NEUTROPHILS # BLD AUTO: 5.37 THOUSANDS/ÂΜL (ref 1.85–7.62)
NEUTS SEG NFR BLD AUTO: 72 % (ref 43–75)
NRBC BLD AUTO-RTO: 0 /100 WBCS
PLATELET # BLD AUTO: 190 THOUSANDS/UL (ref 149–390)
PMV BLD AUTO: 9.4 FL (ref 8.9–12.7)
POTASSIUM SERPL-SCNC: 3.8 MMOL/L (ref 3.5–5.3)
RBC # BLD AUTO: 3.8 MILLION/UL (ref 3.88–5.62)
RHEUMATOID FACT SER QL LA: NEGATIVE
SODIUM SERPL-SCNC: 135 MMOL/L (ref 135–147)
WBC # BLD AUTO: 7.42 THOUSAND/UL (ref 4.31–10.16)

## 2023-06-18 PROCEDURE — 99244 OFF/OP CNSLTJ NEW/EST MOD 40: CPT | Performed by: PODIATRIST

## 2023-06-18 PROCEDURE — 99232 SBSQ HOSP IP/OBS MODERATE 35: CPT | Performed by: PHYSICIAN ASSISTANT

## 2023-06-18 PROCEDURE — 85025 COMPLETE CBC W/AUTO DIFF WBC: CPT | Performed by: PHYSICIAN ASSISTANT

## 2023-06-18 PROCEDURE — 80048 BASIC METABOLIC PNL TOTAL CA: CPT | Performed by: PHYSICIAN ASSISTANT

## 2023-06-18 RX ORDER — VANCOMYCIN HYDROCHLORIDE 1 G/200ML
1000 INJECTION, SOLUTION INTRAVENOUS EVERY 12 HOURS
Status: DISCONTINUED | OUTPATIENT
Start: 2023-06-18 | End: 2023-06-21

## 2023-06-18 RX ADMIN — MULTIPLE VITAMINS W/ MINERALS TAB 1 TABLET: TAB ORAL at 08:55

## 2023-06-18 RX ADMIN — VANCOMYCIN HYDROCHLORIDE 750 MG: 750 INJECTION, SOLUTION INTRAVENOUS at 04:56

## 2023-06-18 RX ADMIN — CEFTRIAXONE 1000 MG: 1 INJECTION, SOLUTION INTRAVENOUS at 15:06

## 2023-06-18 RX ADMIN — ACETAMINOPHEN 650 MG: 325 TABLET, FILM COATED ORAL at 18:42

## 2023-06-18 RX ADMIN — THIAMINE HCL TAB 100 MG 100 MG: 100 TAB at 08:55

## 2023-06-18 RX ADMIN — ACETAMINOPHEN 650 MG: 325 TABLET, FILM COATED ORAL at 05:02

## 2023-06-18 RX ADMIN — ENOXAPARIN SODIUM 40 MG: 100 INJECTION SUBCUTANEOUS at 08:55

## 2023-06-18 RX ADMIN — VANCOMYCIN HYDROCHLORIDE 1000 MG: 1 INJECTION, SOLUTION INTRAVENOUS at 17:35

## 2023-06-18 RX ADMIN — FOLIC ACID 1 MG: 1 TABLET ORAL at 08:55

## 2023-06-18 NOTE — PLAN OF CARE
Problem: PAIN - ADULT  Goal: Verbalizes/displays adequate comfort level or baseline comfort level  Description: Interventions:  - Encourage patient to monitor pain and request assistance  - Assess pain using appropriate pain scale  - Administer analgesics based on type and severity of pain and evaluate response  - Implement non-pharmacological measures as appropriate and evaluate response  - Consider cultural and social influences on pain and pain management  - Notify physician/advanced practitioner if interventions unsuccessful or patient reports new pain  Outcome: Progressing     Problem: INFECTION - ADULT  Goal: Absence or prevention of progression during hospitalization  Description: INTERVENTIONS:  - Assess and monitor for signs and symptoms of infection  - Monitor lab/diagnostic results  - Monitor all insertion sites, i e  indwelling lines, tubes, and drains  - Monitor endotracheal if appropriate and nasal secretions for changes in amount and color  - [place] appropriate cooling/warming therapies per order  - Administer medications as ordered  - Instruct and encourage patient and family to use good hand hygiene technique  - Identify and instruct in appropriate isolation precautions for identified infection/condition  Outcome: Progressing     Problem: SAFETY ADULT  Goal: Patient will remain free of falls  Description: INTERVENTIONS:  - Educate patient/family on patient safety including physical limitations  - Instruct patient to call for assistance with activity   - Consult OT/PT to assist with strengthening/mobility   - Keep Call bell within reach  - Keep bed low and locked with side rails adjusted as appropriate  - Keep care items and personal belongings within reach  - Initiate and maintain comfort rounds  - Make Fall Risk Sign visible to staff  - Offer Toileting every 2 Hours, in advance of need  - Initiate/Maintain alarm  - Obtain necessary fall risk management equipment  - Apply yellow socks and bracelet for high fall risk patients  - Consider moving patient to room near nurses station  Outcome: Progressing  Goal: Maintain or return to baseline ADL function  Description: INTERVENTIONS:  -  Assess patient's ability to carry out ADLs; assess patient's baseline for ADL function and identify physical deficits which impact ability to perform ADLs (bathing, care of mouth/teeth, toileting, grooming, dressing, etc )  - Assess/evaluate cause of self-care deficits   - Assess range of motion  - Assess patient's mobility; develop plan if impaired  - Assess patient's need for assistive devices and provide as appropriate  - Encourage maximum independence but intervene and supervise when necessary  - Involve family in performance of ADLs  - Assess for home care needs following discharge   - Consider OT consult to assist with ADL evaluation and planning for discharge  - Provide patient education as appropriate  Outcome: Progressing  Goal: Maintains/Returns to pre admission functional level  Description: INTERVENTIONS:  - Perform BMAT or MOVE assessment daily    - Set and communicate daily mobility goal to care team and patient/family/caregiver  - Collaborate with rehabilitation services on mobility goals if consulted  - Perform Range of Motion 3 times a day  - Reposition patient every 3 hours    - Dangle patient 3 times a day  - Stand patient 3 times a day  - Ambulate patient 3 times a day  - Out of bed to chair 3 times a day   - Out of bed for meals 3 times a day  - Out of bed for toileting  - Record patient progress and toleration of activity level   Outcome: Progressing     Problem: DISCHARGE PLANNING  Goal: Discharge to home or other facility with appropriate resources  Description: INTERVENTIONS:  - Identify barriers to discharge w/patient and caregiver  - Arrange for needed discharge resources and transportation as appropriate  - Identify discharge learning needs (meds, wound care, etc )  - Arrange for interpretive services to assist at discharge as needed  - Refer to Case Management Department for coordinating discharge planning if the patient needs post-hospital services based on physician/advanced practitioner order or complex needs related to functional status, cognitive ability, or social support system  Outcome: Progressing     Problem: Knowledge Deficit  Goal: Patient/family/caregiver demonstrates understanding of disease process, treatment plan, medications, and discharge instructions  Description: Complete learning assessment and assess knowledge base    Interventions:  - Provide teaching at level of understanding  - Provide teaching via preferred learning methods  Outcome: Progressing

## 2023-06-18 NOTE — PLAN OF CARE
Problem: PAIN - ADULT  Goal: Verbalizes/displays adequate comfort level or baseline comfort level  Description: Interventions:  - Encourage patient to monitor pain and request assistance  - Assess pain using appropriate pain scale  - Administer analgesics based on type and severity of pain and evaluate response  - Implement non-pharmacological measures as appropriate and evaluate response  - Consider cultural and social influences on pain and pain management  - Notify physician/advanced practitioner if interventions unsuccessful or patient reports new pain  Outcome: Progressing     Problem: INFECTION - ADULT  Goal: Absence or prevention of progression during hospitalization  Description: INTERVENTIONS:  - Assess and monitor for signs and symptoms of infection  - Monitor lab/diagnostic results  - Monitor all insertion sites, i e  indwelling lines, tubes, and drains  - Monitor endotracheal if appropriate and nasal secretions for changes in amount and color  - [place] appropriate cooling/warming therapies per order  - Administer medications as ordered  - Instruct and encourage patient and family to use good hand hygiene technique  - Identify and instruct in appropriate isolation precautions for identified infection/condition  Outcome: Progressing     Problem: SAFETY ADULT  Goal: Patient will remain free of falls  Description: INTERVENTIONS:  - Educate patient/family on patient safety including physical limitations  - Instruct patient to call for assistance with activity   - Consult OT/PT to assist with strengthening/mobility   - Keep Call bell within reach  - Keep bed low and locked with side rails adjusted as appropriate  - Keep care items and personal belongings within reach  - Initiate and maintain comfort rounds  - Make Fall Risk Sign visible to staff  - Offer Toileting every 2 Hours, in advance of need  - Initiate/Maintain 2alarm  - Obtain necessary fall risk management equipment: 2  - Apply yellow socks and bracelet for high fall risk patients  - Consider moving patient to room near nurses station  Outcome: Progressing  Goal: Maintain or return to baseline ADL function  Description: INTERVENTIONS:  -  Assess patient's ability to carry out ADLs; assess patient's baseline for ADL function and identify physical deficits which impact ability to perform ADLs (bathing, care of mouth/teeth, toileting, grooming, dressing, etc )  - Assess/evaluate cause of self-care deficits   - Assess range of motion  - Assess patient's mobility; develop plan if impaired  - Assess patient's need for assistive devices and provide as appropriate  - Encourage maximum independence but intervene and supervise when necessary  - Involve family in performance of ADLs  - Assess for home care needs following discharge   - Consider OT consult to assist with ADL evaluation and planning for discharge  - Provide patient education as appropriate  Outcome: Progressing  Goal: Maintains/Returns to pre admission functional level  Description: INTERVENTIONS:  - Perform BMAT or MOVE assessment daily    - Set and communicate daily mobility goal to care team and patient/family/caregiver  - Collaborate with rehabilitation services on mobility goals if consulted  - Perform Range of Motion 2 times a day  - Reposition patient every 2 hours    - Dangle patient 2 times a day  - Stand patient 2 times a day  - Ambulate patient 2 times a day  - Out of bed to chair 2 times a day   - Out of bed for meals 2 times a day  - Out of bed for toileting  - Record patient progress and toleration of activity level   Outcome: Progressing     Problem: DISCHARGE PLANNING  Goal: Discharge to home or other facility with appropriate resources  Description: INTERVENTIONS:  - Identify barriers to discharge w/patient and caregiver  - Arrange for needed discharge resources and transportation as appropriate  - Identify discharge learning needs (meds, wound care, etc )  - Arrange for interpretive services to assist at discharge as needed  - Refer to Case Management Department for coordinating discharge planning if the patient needs post-hospital services based on physician/advanced practitioner order or complex needs related to functional status, cognitive ability, or social support system  Outcome: Progressing     Problem: Knowledge Deficit  Goal: Patient/family/caregiver demonstrates understanding of disease process, treatment plan, medications, and discharge instructions  Description: Complete learning assessment and assess knowledge base    Interventions:  - Provide teaching at level of understanding  - Provide teaching via preferred learning methods  Outcome: Progressing

## 2023-06-18 NOTE — CONSULTS
Consult - Podiatry   Betty Bah 62 y o  male MRN: [id number]  Unit/Bed#: -01 Encounter: [id number]    Assessment/Plan     Septic arthritis vs Inflammatory arthritis  · All labs, radiographs and imaging studies reviewed  · Synovial fluid WBCs - 12,1945/ul which is substantially elevated  · ESR 35, CRP 71 5  · No leukocytosis, negative Lyme, negative rheumatoid, negative uric acid  · No polys or bacteria seen in synovial fluid  · Crystals pending, cultures pending  · Plain film x-rays reviewed, negative for obvious pathology   · MRI completed with reading pending, personally reviewed and noted to have moderate joint effusion of the first MPJ and increased T2 signal uptake in the head of the first metatarsal with a discrete cyst/neoplasm at the neck of the first metatarsal metaphyseal/diaphyseal junction, also noted is soft tissue mass with increased T2 signal within the third interspace which appears to be well demarcated  Await radiology input/interpretation  Pain and swelling right foot, peripheral neuropathy, history of alcohol abuse  · Managed per internal medicine      History of Present Illness     HPI:  Betty Bah is a 62 y o  male who presents with pain and swelling from his right great toe of a 3-day duration  Denies any injury or trauma  Patient reports the swelling is down today and his foot but it is still quite sore  Reports similar pain in his left great toe joint but to a much lesser degree  Patient had arthrocentesis in the ED yesterday with fluid studies ordered  Significant past medical history of alcohol abuse  Podiatry consult requested to evaluate painful swollen right great toe joint  Patient's chart reviewed noting all pertinent clinical laboratory data  Consults  Review of Systems   Constitutional: Negative  HENT: Negative  Eyes: Negative  Respiratory: Negative  Cardiovascular: Negative  Gastrointestinal: Negative      Musculoskeletal: Pain bilateral great "toe joints(R>L)  Skin: Negative  Neurological: Diminished sensation      Historical Information   Past Medical History:   Diagnosis Date   • Alcohol withdrawal syndrome with complication ([facility] [address] ) 0/44/2200     History reviewed  No pertinent surgical history  Social History   Social History     Substance and Sexual Activity   Alcohol Use Yes   • Alcohol/week: 42 0 standard drinks of alcohol   • Types: 42 Cans of beer per week    Comment: drinks 6 pack or IPA (7% alcohol each 12 oz can)  And maybe a pint of beer in the bar     Social History     Substance and Sexual Activity   Drug Use Never     Social History     Tobacco Use   Smoking Status Never   Smokeless Tobacco Never     Family History: History reviewed  No pertinent family history  Meds/Allergies   No medications prior to admission       Allergies   Allergen Reactions   • Penicillins Hives   • Sulfa Antibiotics Other (See Comments)     hives       Objective   First Vitals:   Blood Pressure: 118/75 (06/17/23 0922)  Pulse: 96 (06/17/23 0922)  Temperature: 98 3 °F (36 8 °C) (06/17/23 0922)  Temp Source: Temporal (06/17/23 1508)  Respirations: 18 (06/17/23 0922)  Height: 5' 8\" (172 7 cm) (06/17/23 1508)  Weight - Scale: 74 8 kg (165 lb) (06/17/23 0922)  SpO2: 99 % (06/17/23 0922)    Current Vitals:   Blood Pressure: 115/75 (06/18/23 0726)  Pulse: 80 (06/18/23 0726)  Temperature: 98 2 °F (36 8 °C) (06/17/23 2020)  Temp Source: Temporal (06/17/23 1508)  Respirations: 12 (06/18/23 0726)  Height: 5' 8\" (172 7 cm) (06/17/23 1508)  Weight - Scale: 74 8 kg (165 lb) (06/17/23 1508)  SpO2: 98 % (06/18/23 0726)        /75   Pulse 80   Temp 98 2 °F (36 8 °C)   Resp 12   Ht 5' 8\" (1 727 m)   Wt 74 8 kg (165 lb)   SpO2 98%   BMI 25 09 kg/m²     General Appearance:    Alert, cooperative, no distress, resting comfortably in bed   Head:    Normocephalic, without obvious abnormality, atraumatic   Eyes:    PERRL, conjunctiva/corneas clear, EOM's intact            Nose: " Moist mucous membranes, no drainage or sinus tenderness   Throat:   No tenderness, no exudates   Neck:   Supple, symmetrical, trachea midline, no JVD   Back:     Symmetric, no CVA tenderness   Lungs:     Respirations unlabored   Chest wall:    No tenderness or deformity   Heart:    Rate and rhythm noted on last vitals  Abdomen:     Soft, non-tender, bowel sounds active all four quadrants,     no masses, no organomegaly       Lower Ext/Ortho: Moderate pain with palpation/range of motion right first MPJ and to a lesser degree the left first MPJ  Right first MPJ is warm with edema  No ascending cellulitis appreciated  Neuro/Vasc: CNII-XII intact  Normal strength  Sensation and reflexes: Diminished epicritic sensation bilateral  Pulses: Right: DP 1/4, PT 1/4, Left: DP 1/4, PT 1/4  Capillary Filling: 3 sec, Edema: Localized edema right forefoot     Skin: Texture, Tone and Turgor: Diminished    Digital Hair: None  Atrophic Changes: Mild  Lesions: None  Nail Pathology: Mild dystrophic  Ulcers/Wounds: No open wounds or punctures                 Lab Results:   CBC w/diff  Results from last 7 days   Lab Units 06/18/23  0456   WBC Thousand/uL 7 42   HEMOGLOBIN g/dL 12 7   HEMATOCRIT % 39 4   PLATELETS Thousands/uL 190   NEUTROS PCT % 72   LYMPHS PCT % 14   MONOS PCT % 11   EOS PCT % 2     BMP  Results from last 7 days   Lab Units 06/17/23  0944 06/12/23  0603 06/11/23  1924   POTASSIUM mmol/L 4 4   < >  --    CHLORIDE mmol/L 100   < >  --    CO2 mmol/L 29   < >  --    CO2, I-STAT mmol/L  --   --  26   BUN mg/dL 10   < >  --    CREATININE mg/dL 0 94   < >  --    GLUCOSE, ISTAT mg/dl  --   --  100   CALCIUM mg/dL 9 2   < >  --     < > = values in this interval not displayed       CMP  Results from last 7 days   Lab Units 06/17/23  0944 06/14/23  0517 06/12/23  0603 06/11/23  1924   POTASSIUM mmol/L 4 4 3 3*   < >  --    CHLORIDE mmol/L 100 99   < >  --    CO2 mmol/L 29 26   < >  --    CO2, I-STAT mmol/L  --   --   -- "26   BUN mg/dL 10 6   < >  --    CREATININE mg/dL 0 94 0 77   < >  --    CALCIUM mg/dL 9 2 8 6   < >  --    ALK PHOS U/L  --  90   < >  --    ALT U/L  --  17   < >  --    AST U/L  --  21   < >  --    GLUCOSE, ISTAT mg/dl  --   --   --  100    < > = values in this interval not displayed  @Culture@  Lab Results   Component Value Date    BLOODCX Received in Microbiology Lab  Culture in Progress  06/17/2023    BLOODCX Received in Microbiology Lab  Culture in Progress  06/17/2023     No results found for: \"WOUNDCULT\"      Imaging: I have personally reviewed pertinent films in PACS      EKG, Pathology, and Other Studies: I have personally reviewed pertinent reports  Code Status: Level 1 - Full Code  Advance Directive and Living Will:      Power of :      Please Note: Voice to text dictation software was used in the creation of this document         Jerry Gonzalez DPM      "

## 2023-06-18 NOTE — UTILIZATION REVIEW
Initial Clinical Review    Admission: Date/Time/Statement:   Admission Orders (From admission, onward)     Ordered        06/17/23 1354  Place in Observation  Once                      Orders Placed This Encounter   Procedures   • Place in Observation     Standing Status:   Standing     Number of Occurrences:   1     Order Specific Question:   Level of Care     Answer:   Med Surg [16]     ED Arrival Information     Expected   -    Arrival   6/17/2023 09:15    Acuity   Urgent            Means of arrival   Walk-In    Escorted by   Friend    Service   Hospitalist    Admission type   Emergency            Arrival complaint   medical problem            Chief Complaint   Patient presents with   • Foot Pain     Patient complaint of foot pain and swelling x 2 days        Initial Presentation: 62 y o  male , presented to the ED @ [facility], from home, walk in with friend  Admitted as Observation due to  Swelling Right Foot  Date: 06/17/2023   Patient presented to the emergency department with right foot swelling, erythema and pain near the right great toe x2 days  Patient denies chest pain/palpitations, shortness of breath, nausea/vomiting, abdominal pain, fever/chills  Reports that he was recently discharged from [address] [place] [address] unit on 6/14 to inpatient rehab at [facility]  He has not had any alcohol since discharge  Denies history of gout  Denies any trauma      S/p arthrocentesis, fluid studies pending  Does have significant alcohol use history - ? Gout however uric acid was normal   Received allopurinol and colchicine in the ED  Lyme titer, rheumatoid factor obtained in the ED and pending  Blood cultures pending x2  Does not meet any SIRS criteria  ESR 35 CRP 71 5  Received IV doxycycline in the ED  Will continue with IV rocephin/vancomycin for now pending fluid studies  MRI ordered  Trend WBC and fever curve  Consult podiatry  Continue Thiamine, folic acid        Day 2: 06/18/2023   Follow up on blood cultures  Continue IV rocephin / vanco   Follow up MRI  Trend WBC and fever curve       06/18/2023  Consult Podiatry:    Septic arthritis vs Inflammatory arthritis  • All labs, radiographs and imaging studies reviewed  • Synovial fluid WBCs - 12,1945/ul which is substantially elevated  • ESR 35, CRP 71 5  • No leukocytosis, negative Lyme, negative rheumatoid, negative uric acid  • No polys or bacteria seen in synovial fluid  • Crystals pending, cultures pending  • Plain film x-rays reviewed, negative for obvious pathology   • MRI completed with reading pending, personally reviewed and noted to have moderate joint effusion of the first MPJ and increased T2 signal uptake in the head of the first metatarsal with a discrete cyst/neoplasm at the neck of the first metatarsal metaphyseal/diaphyseal junction, also noted is soft tissue mass with increased T2 signal within the third interspace which appears to be well demarcated    Await radiology input/interpretation    Pain and swelling right foot, peripheral neuropathy, history of alcohol abuse  • Managed per internal medicine    ED Triage Vitals   Temperature Pulse Respirations Blood Pressure SpO2   06/17/23 0922 06/17/23 0922 06/17/23 0922 06/17/23 0922 06/17/23 0922   98 3 °F (36 8 °C) 96 18 118/75 99 %      Temp Source Heart Rate Source Patient Position - Orthostatic VS BP Location FiO2 (%)   06/17/23 1508 06/17/23 0930 06/17/23 1130 06/17/23 1030 --   Temporal Monitor Sitting Right arm       Pain Score       06/17/23 1130       No Pain          Wt Readings from Last 1 Encounters:   06/17/23 74 8 kg (165 lb)     Additional Vital Signs:   Date/Time Temp Pulse Resp BP MAP (mmHg) SpO2 O2 Device Patient Position - Orthostatic VS   06/18/23 07:26:20 -- 80 12 115/75 88 98 % -- --   06/18/23 05:00:04 -- 85 -- 124/81 95 99 % -- --   06/17/23 23:52:33 -- 88 -- 108/77 87 96 % -- --   06/17/23 20:20:41 98 2 °F (36 8 °C) 105 -- 126/85 99 100 % -- --   23 19:31:14 -- 101 -- 150/93 112 100 % -- --   23 1508 97 9 °F (36 6 °C) 88 18 143/92 -- -- -- --   23 15:07:15 97 2 °F (36 2 °C) Abnormal  88 -- 143/92 109 100 % -- --   23 1430 -- 75 18 120/72 -- 99 % None (Room air) Sitting   23 1330 -- 78 18 128/70 -- 100 % None (Room air) Sitting   23 1230 -- 80 18 125/75 -- 100 % None (Room air) Sitting   23 1130 -- 88 17 122/70 85 99 % None (Room air) Sitting   23 1030 -- 90 17 120/72 87 98 % None (Room air) --   23 0930 -- 92 17 118/75 90 99 % -- --     Date and Time Eye Opening Best Verbal Response Best Motor Response [place] Coma Scale Score   23 2000 4 5 6 15   23 1511 4 5 6 15   23 0950 4 5 6 15     2023  EC, NSR    Pertinent Labs/Diagnostic Test Results:   XR toe great min 2 views LEFT   Final Result by Zoë Terrazas MD ([id number])      No acute osseous abnormality  XR toe great min 2 views RIGHT   Final Result by Zoë Terrazas MD (2315)      1  No acute osseous abnormality  2  MRI would be more sensitive for osteomyelitis  One has been performed and will be reported separately        MRI foot/forefoot toes right w wo contrast    (Results Pending)         Results from last 7 days   Lab Units 23  0944 23  0559 23  0603 23  1924 23  1913   WBC Thousand/uL 7 92 6 58 5 75  --  5 43   HEMOGLOBIN g/dL 12 7 14 5 13 8  --  14 8   I STAT HEMOGLOBIN g/dl  --   --   --  14 6  --    HEMATOCRIT % 38 8 43 3 40 6  --  43 5   HEMATOCRIT, ISTAT %  --   --   --  43  --    PLATELETS Thousands/uL 175 142* 161  --  165   NEUTROS ABS Thousands/µL 6 06  --   --   --  3 42     Results from last 7 days   Lab Units 23  0944 23  0517 23  0559 23  0603 23  1924 23  1914   SODIUM mmol/L 136 135 135 138  --  136   POTASSIUM mmol/L 4 4 3 3* 3 5 3 8  --  3 8   CHLORIDE mmol/L 100 99 99 102  --  98   CO2 mmol/L 29 26 26 23  --  25   CO2, I-STAT mmol/L  --   --   --   --  26  --    ANION GAP mmol/L 7 10 10 13  --  13   BUN mg/dL 10 6 5 5  --  5   CREATININE mg/dL 0 94 0 77 0 83 0 76  --  0 79   EGFR ml/min/1 73sq m 89 100 96 101  --  99   CALCIUM mg/dL 9 2 8 6 8 9 8 6  --  9 3   CALCIUM, IONIZED, ISTAT mmol/L  --   --   --   --  1 07*  --    MAGNESIUM mg/dL  --  2 0 1 8* 1 9  --   --      Results from last 7 days   Lab Units 06/14/23  0517 06/13/23  0559 06/12/23 0603 06/11/23 1914   AST U/L 21 28 44* 53*   ALT U/L 17 20 26 30   ALK PHOS U/L 90 103 94 104   TOTAL PROTEIN g/dL 6 7 7 0 6 5 7 6   ALBUMIN g/dL 3 3* 3 5 3 5 3 7   TOTAL BILIRUBIN mg/dL 1 36* 1 73* 1 06* 0 94     Results from last 7 days   Lab Units 06/17/23  0944 06/14/23  0517 06/13/23  0559 06/12/23 0603 06/11/23 1914   GLUCOSE RANDOM mg/dL 100 86 87 81 97       Results from last 7 days   Lab Units 06/11/23  1924   PH, NATHAN I-STAT  7 442*   PCO2, NATHAN ISTAT mm HG 36 3*   PO2, NATHAN ISTAT mm HG 45 0   HCO3, NATHAN ISTAT mmol/L 24 7   I STAT BASE EXC mmol/L 1   I STAT O2 SAT % 83       Results from last 7 days   Lab Units 06/11/23 1913   PROTIME seconds 13 2   INR  0 94   PTT seconds 28     Results from last 7 days   Lab Units 06/17/23  0944   CRP mg/L 71 5*   SED RATE mm/hour 35*     Results from last 7 days   Lab Units 06/11/23 1914   ETHANOL LVL mg/dL 198*     Results from last 7 days   Lab Units 06/17/23  1408 06/17/23  1140   BLOOD CULTURE  Received in Microbiology Lab  Culture in Progress  Received in Microbiology Lab  Culture in Progress    --    GRAM STAIN RESULT   --  No Polys or Bacteria seen     Results from last 7 days   Lab Units 06/17/23 2002 06/17/23 2002   TOTAL COUNTED   --  100   NEUTROPHIL % (SYNOVIAL) %  --  98   MONOCYTE % (SYNOVIAL) %  --  2   WBC FLUID /ul 71,947  --      ED Treatment:   Medication Administration from 06/17/2023 0915 to 06/17/2023 1504       Date/Time Order Dose Route Action     06/17/2023 1137 EDT lidocaine (PF) (XYLOCAINE-MPF) 1 % injection 10 mL 10 mL Infiltration Given by Other     06/17/2023 1257 EDT doxycycline (VIBRAMYCIN) 100 mg in sodium chloride 0 9 % 100 mL IVPB 100 mg Intravenous New Bag     06/17/2023 1256 EDT colchicine (COLCRYS) tablet 1 2 mg 1 2 mg Oral Given     06/17/2023 1256 EDT allopurinol (ZYLOPRIM) tablet 100 mg 100 mg Oral Given        Past Medical History:   Diagnosis Date   • Alcohol withdrawal syndrome with complication ([facility] [address] ) 0/65/1037     Present on Admission:  • Alcohol use disorder, severe, dependence (HCC)  • Swelling of right foot  • Peripheral neuropathy      Admitting Diagnosis: Foot pain [M79 673]  Cellulitis of great toe, left [L03 032]  Septic joint (HCC) [M00 9]  Right foot infection [L08 9]  Transient arthropathy of metatarsophalangeal (MTP) joint of great toe [M12 879]  Age/Sex: 62 y o  male  Admission Orders:  Regular diet  Up & OOB as tolerated  Obtain MRI right foot/toes  [facility] SCDS    Scheduled Medications:  cefTRIAXone, 1,000 mg, Intravenous, Q24H  enoxaparin, 40 mg, Subcutaneous, Daily  folic acid, 1 mg, Oral, Daily  multivitamin-minerals, 1 tablet, Oral, Daily  thiamine, 100 mg, Oral, Daily  vancomycin, 10 mg/kg, Intravenous, Q12H      Continuous IV Infusions:     PRN Meds:  acetaminophen, 650 mg, Oral, Q6H PRN  diphenhydrAMINE, 25 mg, Oral, Q6H PRN  gabapentin, 100 mg, Oral, HS PRN        IP CONSULT TO PODIATRY  IP CONSULT TO PHARMACY    Network Utilization Review Department  ATTENTION: Please call with any questions or concerns to [phone] and carefully listen to the prompts so that you are directed to the right person  All voicemails are confidential   [facility] Members all requests for admission clinical reviews, approved or denied determinations and any other requests to dedicated fax number below belonging to the campus where the patient is receiving treatment   List of dedicated fax numbers for the Facilities:  FACILITY NAME UR FAX NUMBER   ADMISSION DENIALS (Administrative/Medical [facility]) [phone]   [address] (Maternity/NICU/Pediatrics) Karen Lamb [address] [place]  [phone]   [address] [place] [id number] [place] [address] 28 U [facility] [address] [facility] [place] [address] 815 [facility] [phone]

## 2023-06-18 NOTE — PROGRESS NOTES
[place]  Progress Note  Name: Anastasia Moura  MRN: [id number]  Unit/Bed#: -01 I Date of Admission: 6/17/2023   Date of Service: 6/18/2023 I Hospital Day: 0    Assessment/Plan   * Swelling of right foot  Assessment & Plan  · Presented to the emergency department due to right foot erythema, swelling and discomfort x2 days  · S/p arthrocentesis, fluid studies pending  · Does have significant alcohol use history - ? Gout however uric acid was normal  · Received allopurinol and colchicine in the ED  · Lyme titer, rheumatoid factor obtained in the ED and pending  · Blood cultures pending x2  · Does not meet any SIRS criteria  · ESR 35 CRP 71 5  · Received IV doxycycline in the ED  · Will continue with IV rocephin/vancomycin for now pending fluid studies  · MRI completed - read pending  · Trend WBC and fever curve  · Consult podiatry    Peripheral neuropathy  Assessment & Plan  · History of peripheral neuropathy in bilateral lower extremities  · Ambulates with cane at baseline  · Fall precautions    Alcohol use disorder, severe, dependence ([facility] [address] )  Assessment & Plan  · Notes recent alcohol abuse history upwards of 6 beers daily  · Was discharged from [address] [place] [address] detox unit on 6/14  · Denies recent alcohol use since discharge -had been discharged to inpatient rehab at [facility]  · Thiamine, folic acid        VTE Pharmacologic Prophylaxis: VTE Score: 3 Moderate Risk (Score 3-4) - Pharmacological DVT Prophylaxis Ordered: enoxaparin (Lovenox)  Patient Centered Rounds: I performed bedside rounds with nursing staff today  Discussions with Specialists or Other Care Team Provider:     Education and Discussions with Family / Patient: Patient declined call to        Total Time Spent on Date of Encounter in care of patient: 35 minutes This time was spent on one or more of the following: performing physical exam; counseling and coordination of care; obtaining or reviewing history; documenting in the medical record; reviewing/ordering tests, medications or procedures; communicating with other healthcare professionals and discussing with patient's family/caregivers  Current Length of Stay: 0 day(s)  Current Patient Status: Observation   Certification Statement: The patient will continue to require additional inpatient hospital stay due to MRI results, joint aspirate studies, podiatry evaluation  Discharge Plan: Anticipate discharge tomorrow to home  Code Status: Level 1 - Full Code    Subjective:   Patient overall feels well  Still notes right foot pain particularly when bearing weight  Denies chest pain/palpitations, shortness of breath, nausea/vomiting, abdominal pain  Pain is relieved with Tylenol  Objective:     Vitals:   Temp (24hrs), Av 8 °F (36 6 °C), Min:97 2 °F (36 2 °C), Max:98 2 °F (36 8 °C)    Temp:  [97 2 °F (36 2 °C)-98 2 °F (36 8 °C)] 98 2 °F (36 8 °C)  HR:  [] 80  Resp:  [12-18] 12  BP: (108-150)/(70-93) 115/75  SpO2:  [96 %-100 %] 98 %  Body mass index is 25 09 kg/m²  Input and Output Summary (last 24 hours): Intake/Output Summary (Last 24 hours) at 2023 1013  Last data filed at 2023 2101  Gross per 24 hour   Intake 340 ml   Output --   Net 340 ml       Physical Exam:   Physical Exam  Vitals and nursing note reviewed  Constitutional:       General: He is not in acute distress  Appearance: He is well-developed  Comments: No acute distress   HENT:      Head: Normocephalic and atraumatic  Eyes:      General: No scleral icterus  Extraocular Movements: Extraocular movements intact  Conjunctiva/sclera: Conjunctivae normal    Cardiovascular:      Rate and Rhythm: Normal rate and regular rhythm  Heart sounds: No murmur heard  Pulmonary:      Effort: Pulmonary effort is normal  No respiratory distress  Breath sounds: Normal breath sounds  No wheezing, rhonchi or rales     Abdominal:      General: Bowel sounds are normal       Palpations: Abdomen is soft  Tenderness: There is no abdominal tenderness  There is no guarding or rebound  Musculoskeletal:         General: No swelling  Cervical back: Normal range of motion  Comments: Able to move upper/lower sinus bilaterally  Right foot first MTP joint with persistent erythema but swelling appears mildly improved  Tender to palpation   Skin:     General: Skin is warm and dry  Capillary Refill: Capillary refill takes less than 2 seconds  Findings: Erythema present  Neurological:      Mental Status: He is alert and oriented to person, place, and time  Psychiatric:         Mood and Affect: Mood normal          Speech: Speech normal          Behavior: Behavior normal           Additional Data:     Labs:  Results from last 7 days   Lab Units 06/18/23  0456   WBC Thousand/uL 7 42   HEMOGLOBIN g/dL 12 7   HEMATOCRIT % 39 4   PLATELETS Thousands/uL 190   NEUTROS PCT % 72   LYMPHS PCT % 14   MONOS PCT % 11   EOS PCT % 2     Results from last 7 days   Lab Units 06/17/23  0944 06/14/23  0517   SODIUM mmol/L 136 135   POTASSIUM mmol/L 4 4 3 3*   CHLORIDE mmol/L 100 99   CO2 mmol/L 29 26   BUN mg/dL 10 6   CREATININE mg/dL 0 94 0 77   ANION GAP mmol/L 7 10   CALCIUM mg/dL 9 2 8 6   ALBUMIN g/dL  --  3 3*   TOTAL BILIRUBIN mg/dL  --  1 36*   ALK PHOS U/L  --  90   ALT U/L  --  17   AST U/L  --  21   GLUCOSE RANDOM mg/dL 100 86     Results from last 7 days   Lab Units 06/11/23  1913   INR  0 94                   Lines/Drains:  Invasive Devices     Peripheral Intravenous Line  Duration           Peripheral IV 06/17/23 Right Antecubital <1 day                      Imaging: Reviewed radiology reports from this admission including: xray(s)    Recent Cultures (last 7 days):   Results from last 7 days   Lab Units 06/17/23  1408 06/17/23  1140   BLOOD CULTURE  Received in Microbiology Lab  Culture in Progress  Received in Microbiology Lab   Culture in Progress  --    GRAM STAIN RESULT   --  No Polys or Bacteria seen       Last 24 Hours Medication List:   Current Facility-Administered Medications   Medication Dose Route Frequency Provider Last Rate   • acetaminophen  650 mg Oral Q6H PRN Elzbieta Beckett PA-C     • cefTRIAXone  1,000 mg Intravenous Q24H Lanice EULALIA ElizaldeC 1,000 mg (06/17/23 1538)   • diphenhydrAMINE  25 mg Oral Q6H PRN Elzbieta Beckett PA-C     • enoxaparin  40 mg Subcutaneous Daily Lanice Rossana Nam PA-C     • folic acid  1 mg Oral Daily Lanice Rossana Nam PA-C     • gabapentin  100 mg Oral HS PRN Enriqueta Dunham MD     • multivitamin-minerals  1 tablet Oral Daily Juan Manuelice Rosasna Nam PA-C     • thiamine  100 mg Oral Daily Lanice Rossana Nam PA-C     • vancomycin  10 mg/kg Intravenous Q12H Lanice EULALIA ElizaldeC 750 mg (06/18/23 0287)        Today, Patient Was Seen By: Elzbieta Beckett PA-C    **Please Note: This note may have been constructed using a voice recognition system  **

## 2023-06-19 LAB
ANION GAP SERPL CALCULATED.3IONS-SCNC: 7 MMOL/L (ref 4–13)
BASOPHILS # BLD AUTO: 0.04 THOUSANDS/ÂΜL (ref 0–0.1)
BASOPHILS NFR BLD AUTO: 1 % (ref 0–1)
BUN SERPL-MCNC: 7 MG/DL (ref 5–25)
CALCIUM SERPL-MCNC: 9 MG/DL (ref 8.4–10.2)
CHLORIDE SERPL-SCNC: 101 MMOL/L (ref 96–108)
CO2 SERPL-SCNC: 27 MMOL/L (ref 21–32)
CREAT SERPL-MCNC: 0.8 MG/DL (ref 0.6–1.3)
EOSINOPHIL # BLD AUTO: 0.1 THOUSAND/ÂΜL (ref 0–0.61)
EOSINOPHIL NFR BLD AUTO: 2 % (ref 0–6)
ERYTHROCYTE [DISTWIDTH] IN BLOOD BY AUTOMATED COUNT: 11.1 % (ref 11.6–15.1)
GFR SERPL CREATININE-BSD FRML MDRD: 98 ML/MIN/1.73SQ M
GLUCOSE P FAST SERPL-MCNC: 92 MG/DL (ref 65–99)
GLUCOSE SERPL-MCNC: 92 MG/DL (ref 65–140)
HCT VFR BLD AUTO: 38.1 % (ref 36.5–49.3)
HGB BLD-MCNC: 12.7 G/DL (ref 12–17)
IMM GRANULOCYTES # BLD AUTO: 0.01 THOUSAND/UL (ref 0–0.2)
IMM GRANULOCYTES NFR BLD AUTO: 0 % (ref 0–2)
LYMPHOCYTES # BLD AUTO: 0.91 THOUSANDS/ÂΜL (ref 0.6–4.47)
LYMPHOCYTES NFR BLD AUTO: 19 % (ref 14–44)
MCH RBC QN AUTO: 33.7 PG (ref 26.8–34.3)
MCHC RBC AUTO-ENTMCNC: 33.3 G/DL (ref 31.4–37.4)
MCV RBC AUTO: 101 FL (ref 82–98)
MONOCYTES # BLD AUTO: 0.67 THOUSAND/ÂΜL (ref 0.17–1.22)
MONOCYTES NFR BLD AUTO: 14 % (ref 4–12)
NEUTROPHILS # BLD AUTO: 3.17 THOUSANDS/ÂΜL (ref 1.85–7.62)
NEUTS SEG NFR BLD AUTO: 64 % (ref 43–75)
NRBC BLD AUTO-RTO: 0 /100 WBCS
PLATELET # BLD AUTO: 223 THOUSANDS/UL (ref 149–390)
PMV BLD AUTO: 9.3 FL (ref 8.9–12.7)
POTASSIUM SERPL-SCNC: 3.7 MMOL/L (ref 3.5–5.3)
RBC # BLD AUTO: 3.77 MILLION/UL (ref 3.88–5.62)
SODIUM SERPL-SCNC: 135 MMOL/L (ref 135–147)
VANCOMYCIN SERPL-MCNC: 13.5 UG/ML (ref 10–20)
WBC # BLD AUTO: 4.9 THOUSAND/UL (ref 4.31–10.16)

## 2023-06-19 PROCEDURE — 80048 BASIC METABOLIC PNL TOTAL CA: CPT | Performed by: STUDENT IN AN ORGANIZED HEALTH CARE EDUCATION/TRAINING PROGRAM

## 2023-06-19 PROCEDURE — 87040 BLOOD CULTURE FOR BACTERIA: CPT | Performed by: STUDENT IN AN ORGANIZED HEALTH CARE EDUCATION/TRAINING PROGRAM

## 2023-06-19 PROCEDURE — 99232 SBSQ HOSP IP/OBS MODERATE 35: CPT | Performed by: INTERNAL MEDICINE

## 2023-06-19 PROCEDURE — 80202 ASSAY OF VANCOMYCIN: CPT | Performed by: STUDENT IN AN ORGANIZED HEALTH CARE EDUCATION/TRAINING PROGRAM

## 2023-06-19 PROCEDURE — 85025 COMPLETE CBC W/AUTO DIFF WBC: CPT | Performed by: STUDENT IN AN ORGANIZED HEALTH CARE EDUCATION/TRAINING PROGRAM

## 2023-06-19 PROCEDURE — 99232 SBSQ HOSP IP/OBS MODERATE 35: CPT | Performed by: PODIATRIST

## 2023-06-19 RX ORDER — PREDNISONE 20 MG/1
40 TABLET ORAL DAILY
Status: DISCONTINUED | OUTPATIENT
Start: 2023-06-19 | End: 2023-06-22 | Stop reason: HOSPADM

## 2023-06-19 RX ADMIN — VANCOMYCIN HYDROCHLORIDE 1000 MG: 1 INJECTION, SOLUTION INTRAVENOUS at 18:11

## 2023-06-19 RX ADMIN — FOLIC ACID 1 MG: 1 TABLET ORAL at 08:45

## 2023-06-19 RX ADMIN — THIAMINE HCL TAB 100 MG 100 MG: 100 TAB at 08:44

## 2023-06-19 RX ADMIN — CEFTRIAXONE 1000 MG: 1 INJECTION, SOLUTION INTRAVENOUS at 14:16

## 2023-06-19 RX ADMIN — ACETAMINOPHEN 650 MG: 325 TABLET, FILM COATED ORAL at 03:32

## 2023-06-19 RX ADMIN — MULTIPLE VITAMINS W/ MINERALS TAB 1 TABLET: TAB ORAL at 08:44

## 2023-06-19 RX ADMIN — PREDNISONE 40 MG: 20 TABLET ORAL at 18:51

## 2023-06-19 RX ADMIN — VANCOMYCIN HYDROCHLORIDE 1000 MG: 1 INJECTION, SOLUTION INTRAVENOUS at 04:04

## 2023-06-19 RX ADMIN — ACETAMINOPHEN 650 MG: 325 TABLET, FILM COATED ORAL at 20:48

## 2023-06-19 RX ADMIN — ACETAMINOPHEN 650 MG: 325 TABLET, FILM COATED ORAL at 14:10

## 2023-06-19 RX ADMIN — ENOXAPARIN SODIUM 40 MG: 100 INJECTION SUBCUTANEOUS at 08:45

## 2023-06-19 NOTE — ASSESSMENT & PLAN NOTE
· Presented to the emergency department due to right foot erythema, swelling and discomfort x2 days  · S/p arthrocentesis, fluid studies pending  · Does have significant alcohol use history - ?  Gout however uric acid was normal  · Received allopurinol and colchicine in the ED  · Lyme titer, rheumatoid factor obtained in the ED and pending  · Blood cultures growing gram-positive cocci in clusters  · Repeat blood cultures ordered  · Does not meet any SIRS criteria  · ESR 35 CRP 71 5  · Received IV doxycycline in the ED  · Continue with IV rocephin/vancomycin for now pending fluid studies  · MRI completed - read pending  · Podiatry follow-up  · Trend WBC and fever curve  · Continue IV antibiotics

## 2023-06-19 NOTE — PLAN OF CARE
Patient notified of labs results and new lab orders placed. Patient is aware she will need to fast for the lipid panel.   Problem: PAIN - ADULT  Goal: Verbalizes/displays adequate comfort level or baseline comfort level  Description: Interventions:  - Encourage patient to monitor pain and request assistance  - Assess pain using appropriate pain scale  - Administer analgesics based on type and severity of pain and evaluate response  - Implement non-pharmacological measures as appropriate and evaluate response  - Consider cultural and social influences on pain and pain management  - Notify physician/advanced practitioner if interventions unsuccessful or patient reports new pain  Outcome: Progressing     Problem: INFECTION - ADULT  Goal: Absence or prevention of progression during hospitalization  Description: INTERVENTIONS:  - Assess and monitor for signs and symptoms of infection  - Monitor lab/diagnostic results  - Monitor all insertion sites, i e  indwelling lines, tubes, and drains  - Monitor endotracheal if appropriate and nasal secretions for changes in amount and color  - Usk appropriate cooling/warming therapies per order  - Administer medications as ordered  - Instruct and encourage patient and family to use good hand hygiene technique  - Identify and instruct in appropriate isolation precautions for identified infection/condition  Outcome: Progressing     Problem: SAFETY ADULT  Goal: Patient will remain free of falls  Description: INTERVENTIONS:  - Educate patient/family on patient safety including physical limitations  - Instruct patient to call for assistance with activity   - Consult OT/PT to assist with strengthening/mobility   - Keep Call bell within reach  - Keep bed low and locked with side rails adjusted as appropriate  - Keep care items and personal belongings within reach  - Initiate and maintain comfort rounds  - Make Fall Risk Sign visible to staff  - Offer Toileting every  Hours, in advance of need  - Initiate/Maintain alarm  - Obtain necessary fall risk management equipment:   - Apply yellow socks and bracelet for high fall risk patients  - Consider moving patient to room near nurses station  Outcome: Progressing  Goal: Maintain or return to baseline ADL function  Description: INTERVENTIONS:  -  Assess patient's ability to carry out ADLs; assess patient's baseline for ADL function and identify physical deficits which impact ability to perform ADLs (bathing, care of mouth/teeth, toileting, grooming, dressing, etc )  - Assess/evaluate cause of self-care deficits   - Assess range of motion  - Assess patient's mobility; develop plan if impaired  - Assess patient's need for assistive devices and provide as appropriate  - Encourage maximum independence but intervene and supervise when necessary  - Involve family in performance of ADLs  - Assess for home care needs following discharge   - Consider OT consult to assist with ADL evaluation and planning for discharge  - Provide patient education as appropriate  Outcome: Progressing  Goal: Maintains/Returns to pre admission functional level  Description: INTERVENTIONS:  - Perform BMAT or MOVE assessment daily    - Set and communicate daily mobility goal to care team and patient/family/caregiver  - Collaborate with rehabilitation services on mobility goals if consulted  - Perform Range of Motion  times a day  - Reposition patient every  hours    - Dangle patient  times a day  - Stand patient  times a day  - Ambulate patient  times a day  - Out of bed to chair  times a day   - Out of bed for meals  times a day  - Out of bed for toileting  - Record patient progress and toleration of activity level   Outcome: Progressing     Problem: DISCHARGE PLANNING  Goal: Discharge to home or other facility with appropriate resources  Description: INTERVENTIONS:  - Identify barriers to discharge w/patient and caregiver  - Arrange for needed discharge resources and transportation as appropriate  - Identify discharge learning needs (meds, wound care, etc )  - Arrange for interpretive services to assist at discharge as needed  - Refer to Case Management Department for coordinating discharge planning if the patient needs post-hospital services based on physician/advanced practitioner order or complex needs related to functional status, cognitive ability, or social support system  Outcome: Progressing     Problem: Knowledge Deficit  Goal: Patient/family/caregiver demonstrates understanding of disease process, treatment plan, medications, and discharge instructions  Description: Complete learning assessment and assess knowledge base    Interventions:  - Provide teaching at level of understanding  - Provide teaching via preferred learning methods  Outcome: Progressing

## 2023-06-19 NOTE — PROGRESS NOTES
"Progress Note - Podiatry  Daksha Sep 62 y o  male MRN: [id number]  Unit/Bed#: -01 Encounter: [id number]    Assessment/Plan:    Septic arthritis vs inflammatory arthritis  · Over past 24 hours left first MPJ pain increased substantially overnight, right seems to be improving  · Labs reviewed: WBC 4 9, Temp 97 5, synovial fluid culture no growth  · Right foot MRI interpretation per radiology reviewed, concluded changes are consistent with acute osteoarthritic 1st MPJ  · Case reviewed with Dr Ilya Beckett and Dr Lul Clark, plan to continue with current IV antibiotic coverage and add prednisone to address acute inflammatory process  We will make patient n p o  with anticipation of synovial biopsy tomorrow afternoon with Dr Ilya Beckett  Subjective/Objective   Chief Complaint:   Chief Complaint   Patient presents with   • Foot Pain     Patient complaint of foot pain and swelling x 2 days        Subjective: 55-year-old male resting comfortably in bed, reports overnight around 2 AM he had a painful event involving his left great toe joint  Reports his right great toe joint is feeling a little bit better  Blood pressure 114/76, pulse 77, temperature 97 5 °F (36 4 °C), resp  rate 17, height 5' 8\" (1 727 m), weight 74 8 kg (165 lb), SpO2 98 %  ,Body mass index is 25 09 kg/m²  Invasive Devices     Peripheral Intravenous Line  Duration           Peripheral IV 06/18/23 Right;Ventral (anterior) Forearm <1 day                Physical Exam:   General appearance: alert, cooperative and no distress  Neuro/Vascular: Palpable pedal pulses bilaterally, diminished epicritic sensation consistent with neuropathy  Extremity:   · Right foot: Diminished erythema/edema right first MPJ though pain with palpation and range of motion persists of moderate nature  · Left foot: Pain with palpation and range of motion left first MPJ though less edema/erythema/calor noted then compared to the right first MPJ                Labs and other " "studies:   CBC w/diff  Results from last 7 days   Lab Units 06/19/23  0238   WBC Thousand/uL 4 90   HEMOGLOBIN g/dL 12 7   HEMATOCRIT % 38 1   PLATELETS Thousands/uL 223   NEUTROS PCT % 64   LYMPHS PCT % 19   MONOS PCT % 14*   EOS PCT % 2     BMP  Results from last 7 days   Lab Units 06/19/23  0238   POTASSIUM mmol/L 3 7   CHLORIDE mmol/L 101   CO2 mmol/L 27   BUN mg/dL 7   CREATININE mg/dL 0 80   CALCIUM mg/dL 9 0     CMP  Results from last 7 days   Lab Units 06/19/23  0238 06/17/23  0944 06/14/23  0517   POTASSIUM mmol/L 3 7   < > 3 3*   CHLORIDE mmol/L 101   < > 99   CO2 mmol/L 27   < > 26   BUN mg/dL 7   < > 6   CREATININE mg/dL 0 80   < > 0 77   CALCIUM mg/dL 9 0   < > 8 6   ALK PHOS U/L  --   --  90   ALT U/L  --   --  17   AST U/L  --   --  21    < > = values in this interval not displayed  @Culture@  Lab Results   Component Value Date    BLOODCX Received in Microbiology Lab  Culture in Progress  06/19/2023    BLOODCX Received in Microbiology Lab  Culture in Progress   06/19/2023    BLOODCX No Growth at 24 hrs  06/17/2023     No results found for: \"WOUNDCULT\"      Current Facility-Administered Medications:   •  acetaminophen (TYLENOL) tablet 650 mg, 650 mg, Oral, Q6H PRN, Sonia Gaona PA-C, 650 mg at 06/19/23 3500  •  cefTRIAXone (ROCEPHIN) IVPB (premix in dextrose) 1,000 mg 50 mL, 1,000 mg, Intravenous, Q24H, Sonia Gaona PA-C, Last Rate: 100 mL/hr at 06/18/23 1506, 1,000 mg at 06/18/23 1506  •  diphenhydrAMINE (BENADRYL) tablet 25 mg, 25 mg, Oral, Q6H PRN, Rohan Galarza PA-C  •  enoxaparin (LOVENOX) subcutaneous injection 40 mg, 40 mg, Subcutaneous, Daily, Sonia Gaona PA-C, 40 mg at 08/66/66 2566  •  folic acid (FOLVITE) tablet 1 mg, 1 mg, Oral, Daily, Sonia Gaona PA-C, 1 mg at 06/19/23 0845  •  gabapentin (NEURONTIN) capsule 100 mg, 100 mg, Oral, HS PRN, Melania Woodward MD  •  multivitamin-minerals (CENTRUM) tablet 1 tablet, 1 tablet, Oral, Daily, Rohan Galarza, " JC, 1 tablet at 06/19/23 0844  •  thiamine tablet 100 mg, 100 mg, Oral, Daily, Carlus Dakin Gyarmaty, CJ, 100 mg at 06/19/23 9334  •  vancomycin (VANCOCIN) IVPB (premix in dextrose) 1,000 mg 200 mL, 1,000 mg, Intravenous, Q12H, Carole Mata PA-C, Last Rate: 200 mL/hr at 06/19/23 0404, 1,000 mg at 06/19/23 0404    Imaging: I have personally reviewed pertinent films in PACS  EKG, Pathology, and Other Studies: I have personally reviewed pertinent reports    VTE Pharmacologic Prophylaxis: Enoxaparin (Lovenox)  VTE Mechanical Prophylaxis: sequential compression device    Dora Ching DPM

## 2023-06-19 NOTE — QUICK NOTE
1/2 blood cultures positive for gram positive cocci in clusters  On vanc  2nd set is negative  Will repeat b/c in AM  If 2nd set returns positive - consult ID

## 2023-06-19 NOTE — PROGRESS NOTES
Alex Lujan is a 62 y o  male who is currently ordered Vancomycin IV with management by the Pharmacy Consult service  Relevant clinical data and objective / subjective history reviewed  Vancomycin Assessment:  1  Indication and Goal AUC/Trough: Bone/joint infection (goal -600, trough >10), -600, trough >10  2  Clinical Status: stable  3  Micro:     4  Renal Function:  · SCr: 0 8 mg/dL  · CrCl: 97 4 mL/min  · Renal replacement: Not on dialysis  5  Days of Therapy: 3  6  Current Dose: 1000mg every 12 hours  Vancomycin Plan: trough level resulted 13 5, continue current dose since the level is therapeutic  1  New Dosing: No change  2  Estimated AUC: 494 mcg*hr/mL  3  Estimated Trough: 15 2 mcg/mL  4  Next Level: 6/21/23 at 0530  5  Renal Function Monitoring: Daily BMP and [place] will continue to follow closely for s/sx of nephrotoxicity, infusion reactions and appropriateness of therapy  BMP and CBC will be ordered per protocol  We will continue to follow the patient’s culture results and clinical progress daily      Kimberley Herrera, Pharmacist, PharmD, BCPS

## 2023-06-19 NOTE — PLAN OF CARE
Problem: PAIN - ADULT  Goal: Verbalizes/displays adequate comfort level or baseline comfort level  Description: Interventions:  - Encourage patient to monitor pain and request assistance  - Assess pain using appropriate pain scale  - Administer analgesics based on type and severity of pain and evaluate response  - Implement non-pharmacological measures as appropriate and evaluate response  - Consider cultural and social influences on pain and pain management  - Notify physician/advanced practitioner if interventions unsuccessful or patient reports new pain  Outcome: Progressing     Problem: INFECTION - ADULT  Goal: Absence or prevention of progression during hospitalization  Description: INTERVENTIONS:  - Assess and monitor for signs and symptoms of infection  - Monitor lab/diagnostic results  - Monitor all insertion sites, i e  indwelling lines, tubes, and drains  - Monitor endotracheal if appropriate and nasal secretions for changes in amount and color  - Musella appropriate cooling/warming therapies per order  - Administer medications as ordered  - Instruct and encourage patient and family to use good hand hygiene technique  - Identify and instruct in appropriate isolation precautions for identified infection/condition  Outcome: Progressing     Problem: SAFETY ADULT  Goal: Patient will remain free of falls  Description: INTERVENTIONS:  - Educate patient/family on patient safety including physical limitations  - Instruct patient to call for assistance with activity   - Consult OT/PT to assist with strengthening/mobility   - Keep Call bell within reach  - Keep bed low and locked with side rails adjusted as appropriate  - Keep care items and personal belongings within reach  - Initiate and maintain comfort rounds  - Make Fall Risk Sign visible to staff  - Offer Toileting every 2 Hours, in advance of need  - Initiate/Maintain 2alarm  - Obtain necessary fall risk management equipment: 2  - Apply yellow socks and bracelet for high fall risk patients  - Consider moving patient to room near nurses station  Outcome: Progressing  Goal: Maintain or return to baseline ADL function  Description: INTERVENTIONS:  -  Assess patient's ability to carry out ADLs; assess patient's baseline for ADL function and identify physical deficits which impact ability to perform ADLs (bathing, care of mouth/teeth, toileting, grooming, dressing, etc )  - Assess/evaluate cause of self-care deficits   - Assess range of motion  - Assess patient's mobility; develop plan if impaired  - Assess patient's need for assistive devices and provide as appropriate  - Encourage maximum independence but intervene and supervise when necessary  - Involve family in performance of ADLs  - Assess for home care needs following discharge   - Consider OT consult to assist with ADL evaluation and planning for discharge  - Provide patient education as appropriate  Outcome: Progressing  Goal: Maintains/Returns to pre admission functional level  Description: INTERVENTIONS:  - Perform BMAT or MOVE assessment daily    - Set and communicate daily mobility goal to care team and patient/family/caregiver  - Collaborate with rehabilitation services on mobility goals if consulted  - Perform Range of Motion 2 times a day  - Reposition patient every 2 hours    - Dangle patient 2 times a day  - Stand patient 2 times a day  - Ambulate patient 2 times a day  - Out of bed to chair 2 times a day   - Out of bed for meals 2 times a day  - Out of bed for toileting  - Record patient progress and toleration of activity level   Outcome: Progressing     Problem: DISCHARGE PLANNING  Goal: Discharge to home or other facility with appropriate resources  Description: INTERVENTIONS:  - Identify barriers to discharge w/patient and caregiver  - Arrange for needed discharge resources and transportation as appropriate  - Identify discharge learning needs (meds, wound care, etc )  - Arrange for interpretive services to assist at discharge as needed  - Refer to Case Management Department for coordinating discharge planning if the patient needs post-hospital services based on physician/advanced practitioner order or complex needs related to functional status, cognitive ability, or social support system  Outcome: Progressing     Problem: Knowledge Deficit  Goal: Patient/family/caregiver demonstrates understanding of disease process, treatment plan, medications, and discharge instructions  Description: Complete learning assessment and assess knowledge base    Interventions:  - Provide teaching at level of understanding  - Provide teaching via preferred learning methods  Outcome: Progressing

## 2023-06-19 NOTE — CASE MANAGEMENT
Case Management Assessment & Discharge Planning Note    Patient name Mari Rivers  Location [address] [id number]/-[id number] MRN [id number]  : 1965 Date 2023       Current Admission Date: 2023  Current Admission Diagnosis:Swelling of right foot   Patient Active Problem List    Diagnosis Date Noted   • Swelling of right foot 2023   • Hearing impaired 2023   • Peripheral neuropathy 2023   • Alcohol use disorder, severe, dependence ([facility] [address] ) 2023   • Macrocytosis without anemia 2023      LOS (days): 0  Geometric Mean LOS (GMLOS) (days):   Days to GMLOS:     OBJECTIVE:  PATIENT READMITTED TO HOSPITAL  Risk of Unplanned Readmission Score: 8 67         Current admission status: Inpatient       Preferred Pharmacy:   PATIENT/FAMILY REPORTS NO PREFERRED PHARMACY  No address on file      Primary Care Provider: No primary care provider on file  Primary Insurance:   Secondary Insurance:     ASSESSMENT:  [facility] Representative - Friend   Primary Phone: [phone] (Mobile)               Advance Directives  Does patient have a [address] [place] [address] Avenue?: No  Was patient offered paperwork?: Yes (declined)  Does patient currently have a Health Care decision maker?: Yes, please see Health Care Proxy section  Does patient have Advance Directives?: No  Was patient offered paperwork?: Yes (declined)    Readmission Root Cause  30 Day Readmission: No    Patient Information  Admitted from[de-identified] Home  Mental Status: Alert  During Assessment patient was accompanied by: Not accompanied during assessment  Assessment information provided by[de-identified] Patient  Primary Caregiver: Self  Support Systems: Friend  Home entry access options   Select all that apply : Stairs  Number of steps to enter home : 2  Type of Current Residence: Other (Comment) (rents a room with a friend)  In the last 12 months, was there a time when you were not able to pay the mortgage or rent on time?: No  In the last 12 months, how many places have you lived?: 1  In the last 12 months, was there a time when you did not have a steady place to sleep or slept in a shelter (including now)?: No  Homeless/housing insecurity resource given?: N/A  Living Arrangements: Lives w/ Friend    Activities of Daily Living Prior to Admission  Functional Status: Independent  Completes ADLs independently?: Yes  Ambulates independently?: Yes  Does patient use assisted devices?: No  Does patient currently own DME?: No  Does patient have a history of Outpatient Therapy (PT/OT)?: No  Does the patient have a history of Short-Term Rehab?: No  Does patient have a history of HHC?: No  Does patient currently have [address]?: No         Patient Information Continued  Does patient have prescription coverage?: Yes  Within the past 12 months, you worried that your food would run out before you got the money to buy more : Never true  Within the past 12 months, the food you bought just didn't last and you didn't have money to get more : Never true  Food insecurity resource given?: N/A  Does patient receive dialysis treatments?: No  Does patient have a history of substance abuse?: Yes  Historical substance use preference: Alcohol/ETOH  Is patient currently in treatment for substance abuse?: Yes  Does patient have a history of Mental Health Diagnosis?: No      Means of Transportation  In the past 12 months, has lack of transportation kept you from medical appointments or from getting medications?: No  In the past 12 months, has lack of transportation kept you from meetings, work, or from getting things needed for daily living?: No  Was application for public transport provided?: N/A      DISCHARGE DETAILS:    Discharge planning discussed with[de-identified] patient  Freedom of Choice: Yes  Comments - Freedom of Choice: pt plans to return to [facility] when medically stable  CM contacted family/caregiver?: No- see comments (declined)  Were Treatment Team discharge recommendations reviewed with patient/caregiver?: Yes  Did patient/caregiver verbalize understanding of patient care needs?: Yes  Were patient/caregiver advised of the risks associated with not following Treatment Team discharge recommendations?: Yes    [address]         Is the patient interested in [facility] Dodd at discharge?: No    DME Referral Provided  Referral made for DME?: No    Treatment Team Recommendation: Other (Alcohol rehab)  Discharge Destination Plan[de-identified] Other (alcohol rehab)  Transport at Discharge : Other (Comment) [facility])        Additional Comments: Met with patient to discuss the role of CM and to discuss any help pt may need prior to dc  Pt resides with 2 roommates where he rents a room  Pt has a 1st floor setup with 2 ROSA MARIA  Pt performed ADL's indptly pta, pt uses a cane  No hx of HHC or rehab  No hx of mental health treatment  Pt was admitted from [facility] for alcohol rehab  Pt was at [facility] Detox unit from 6/11-6/14  Pt plans to return to [facility] when medically stable  Pt does not have insurance and reports a PATHS referral was made when he was at [address] [place] [address]  CM called [facility] and spoke to Kriss [phone] who confirms plan is for pt to return when stable  Per Kriss, pt cannot be on IV abx and will need to be independent with ADL's  Kriss states a nurse to nurse report will be needed prior to pt's return--report# [phone]  E-mail sent to DARLYN HIDALGO financial counselor who confirms MA process was started

## 2023-06-19 NOTE — UTILIZATION REVIEW
"Continued Stay Review    WAS OBSERVATION 06/17/2023 @ 1354 CONVERTED TO INPATIENT ADMISSION 06/19/2023 @ 1104 DUE TO CONTINUED STAY REQUIRED TO CARE FOR PATIENT WITH Swelling right foot  Admission Orders (From admission, onward)     Ordered        06/19/23 1104  Inpatient Admission  Once            06/17/23 1354  Place in Observation  Once                      Date 1: 06/19/2023                          Orders Placed This Encounter   Procedures   • Inpatient Admission     Standing Status:   Standing     Number of Occurrences:   1     Order Specific Question:   Level of Care     Answer:   Med Surg [16]     Order Specific Question:   Estimated length of stay     Answer:   More than 2 Midnights     Order Specific Question:   Certification     Answer:   I certify that inpatient services are medically necessary for this patient for a duration of greater than two midnights  See H&P and MD Progress Notes for additional information about the patient's course of treatment  Current Patient Class: Inpatient Current Level of Care: Med/Surg    HPI:58 y o  male initially admitted on 06/17/2023    Assessment/Plan: Swelling Right Foot  S/p arthrocentesis, fluid studies pending  Does have significant alcohol use history - ? Gout however uric acid was normal   Follow up Lyme titer, rheumatoid factor obtained in the ED  Blood cultures growing gram-positive cocci in clusters  Repeat blood cultures ordered  ESR 35 CRP 71 5  Continue with IV rocephin/vancomycin for now pending fluid studies  MRI completed - read pending  Trend WBC and fever curve        Vital Signs: /76   Pulse 77   Temp 97 5 °F (36 4 °C)   Resp 17   Ht 5' 8\" (1 727 m)   Wt 74 8 kg (165 lb)   SpO2 98%   BMI 25 09 kg/m²     Pertinent Labs/Diagnostic Results:     Results from last 7 days   Lab Units 06/19/23  0238 06/18/23  0456 06/17/23  0944 06/13/23  0559   WBC Thousand/uL 4 90 7 42 7 92 6 58   HEMOGLOBIN g/dL 12 7 12 7 12 7 14 5 " HEMATOCRIT % 38 1 39 4 38 8 43 3   PLATELETS Thousands/uL 223 190 175 142*   NEUTROS ABS Thousands/µL 3 17 5 37 6 06  --      Results from last 7 days   Lab Units 06/19/23  0238 06/18/23  0456 06/17/23  0944 06/14/23  0517 06/13/23  0559   SODIUM mmol/L 135 135 136 135 135   POTASSIUM mmol/L 3 7 3 8 4 4 3 3* 3 5   CHLORIDE mmol/L 101 103 100 99 99   CO2 mmol/L 27 23 29 26 26   ANION GAP mmol/L 7 9 7 10 10   BUN mg/dL 7 7 10 6 5   CREATININE mg/dL 0 80 0 71 0 94 0 77 0 83   EGFR ml/min/1 73sq m 98 103 89 100 96   CALCIUM mg/dL 9 0 8 9 9 2 8 6 8 9   MAGNESIUM mg/dL  --   --   --  2 0 1 8*     Results from last 7 days   Lab Units 06/14/23  0517 06/13/23  0559   AST U/L 21 28   ALT U/L 17 20   ALK PHOS U/L 90 103   TOTAL PROTEIN g/dL 6 7 7 0   ALBUMIN g/dL 3 3* 3 5   TOTAL BILIRUBIN mg/dL 1 36* 1 73*     Results from last 7 days   Lab Units 06/19/23  0238 06/18/23  0456 06/17/23  0944 06/14/23  0517 06/13/23  0559   GLUCOSE RANDOM mg/dL 92 65 100 86 87     Results from last 7 days   Lab Units 06/17/23  0944   CRP mg/L 71 5*   SED RATE mm/hour 35*     Results from last 7 days   Lab Units 06/19/23  0239 06/17/23  1408 06/17/23  1140   BLOOD CULTURE  Received in Microbiology Lab  Culture in Progress  Received in Microbiology Lab  Culture in Progress   No Growth at 24 hrs   --    GRAM STAIN RESULT   --  Gram positive cocci in clusters* No Polys or Bacteria seen   BODY FLUID CULTURE, STERILE   --   --  No growth     Results from last 7 days   Lab Units 06/17/23 2002 06/17/23 2002   TOTAL COUNTED   --  100   NEUTROPHIL % (SYNOVIAL) %  --  98   MONOCYTE % (SYNOVIAL) %  --  2   WBC FLUID /ul 13,009  --      Results from last 7 days   Lab Units 06/19/23  0249   VANCOMYCIN RM ug/mL 13 5       Medications:   Scheduled Medications:  cefTRIAXone, 1,000 mg, Intravenous, Q24H  enoxaparin, 40 mg, Subcutaneous, Daily  folic acid, 1 mg, Oral, Daily  multivitamin-minerals, 1 tablet, Oral, Daily  thiamine, 100 mg, Oral, Daily  vancomycin, 1,000 mg, Intravenous, Q12H      Continuous IV Infusions:     PRN Meds:  acetaminophen, 650 mg, Oral, Q6H PRN  diphenhydrAMINE, 25 mg, Oral, Q6H PRN  gabapentin, 100 mg, Oral, HS PRN        Discharge Plan: D    Network Utilization Review Department  ATTENTION: Please call with any questions or concerns to [phone] and carefully listen to the prompts so that you are directed to the right person  All voicemails are confidential   Kasey Burton all requests for admission clinical reviews, approved or denied determinations and any other requests to dedicated fax number below belonging to the campus where the patient is receiving treatment   List of dedicated fax numbers for the Facilities:  [address] DENIALS (Administrative/Medical Necessity) [phone]   [address] (Maternity/NICU/Pediatrics) [phone] [address] [phone]   [facility] [address] [phone]   [address] [address] [facility] [phone]   [address] [facility] [address] 8[address] [facility] [phone]

## 2023-06-19 NOTE — PROGRESS NOTES
[place]  Progress Note  Name: Carol Mejia  MRN: [id number]  Unit/Bed#: -01 I Date of Admission: 6/17/2023   Date of Service: 6/19/2023 I Hospital Day: 0    Assessment/Plan   Peripheral neuropathy  Assessment & Plan  · History of peripheral neuropathy in bilateral lower extremities  · Ambulates with cane at baseline  · Fall precautions    Alcohol use disorder, severe, dependence ([facility] [address] )  Assessment & Plan  · Notes recent alcohol abuse history upwards of 6 beers daily  · Was discharged from [address] [place] [address] unit on 6/14  · Denies recent alcohol use since discharge -had been discharged to inpatient rehab at [place] [address]  · Thiamine, folic acid    * Swelling of right foot  Assessment & Plan  · Presented to the emergency department due to right foot erythema, swelling and discomfort x2 days  · S/p arthrocentesis, fluid studies pending  · Does have significant alcohol use history - ? Gout however uric acid was normal  · Received allopurinol and colchicine in the ED  · Lyme titer, rheumatoid factor obtained in the ED and pending  · Blood cultures growing gram-positive cocci in clusters  · Repeat blood cultures ordered  · Does not meet any SIRS criteria  · ESR 35 CRP 71 5  · Received IV doxycycline in the ED  · Continue with IV rocephin/vancomycin for now pending fluid studies  · MRI completed - read pending  · Podiatry follow-up  · Trend WBC and fever curve  · Continue IV antibiotics             Labs & Imaging: I have personally reviewed pertinent reports  VTE Prophylaxis: in place  Code Status:   Level 1 - Full Code    Patient Centered Rounds: I have performed bedside rounds with nursing staff today      Discussions with Specialists or Other Care Team Provider: Podiatry    Education and Discussions with Family / Patient: Patient declined family    Current Length of Stay: 0 day(s)    Current Patient Status: Inpatient   Certification Statement: The patient will "continue to require additional inpatient hospital stay due to see my assessment and plan  Subjective:   Patient is seen and examined at bedside  No new complaints  Afebrile  All other ROS are negative  Objective:    Vitals: Blood pressure 114/76, pulse 77, temperature 97 5 °F (36 4 °C), resp  rate 17, height 5' 8\" (1 727 m), weight 74 8 kg (165 lb), SpO2 98 %  ,Body mass index is 25 09 kg/m²  SPO2 RA Rest    Flowsheet Row ED to Hosp-Admission (Current) from 6/17/2023 in [facility] Med S[facility]   SpO2 98 %   SpO2 Activity At Rest   O2 Device None (Room air)   O2 Flow Rate --        I&O:     Intake/Output Summary (Last 24 hours) at 6/19/2023 1341  Last data filed at 6/18/2023 1708  Gross per 24 hour   Intake 240 ml   Output --   Net 240 ml       Physical Exam:    General- Alert, lying comfortably in bed  Not in any acute distress  Neck- Supple, No JVD  CVS- regular, S1 and S2 normal  Chest- Bilateral Air entry, No rhochi, crackles or wheezing present  Abdomen- soft, nontender, not distended, no guarding or rigidity, BS+  Extremities-  No pedal edema, No calf tenderness  Right foot-extremity [place] with erythema but improved swelling  CNS-   Alert, awake and orientedx3  No focal deficits present  Invasive Devices     Peripheral Intravenous Line  Duration           Peripheral IV 06/18/23 Right;Ventral (anterior) Forearm <1 day                      Social History  reviewed  History reviewed  No pertinent family history   reviewed    Meds:  Current Facility-Administered Medications   Medication Dose Route Frequency Provider Last Rate Last Admin   • acetaminophen (TYLENOL) tablet 650 mg  650 mg Oral Q6H PRN EULALIA BuckC   650 mg at 06/19/23 1386   • cefTRIAXone (ROCEPHIN) IVPB (premix in dextrose) 1,000 mg 50 mL  1,000 mg Intravenous Q24H Memo Hatfield PAPatC 100 mL/hr at 06/18/23 1506 1,000 mg at 06/18/23 1506   • diphenhydrAMINE (BENADRYL) tablet 25 mg  25 mg Oral Q6H " "PRN Rhea Sewell PA-C       • enoxaparin (LOVENOX) subcutaneous injection 40 mg  40 mg Subcutaneous Daily ANUSHA Vasquez-C   40 mg at 88/94/08 2979   • folic acid (FOLVITE) tablet 1 mg  1 mg Oral Daily ANUSHA Vasquez-C   1 mg at 06/19/23 0845   • gabapentin (NEURONTIN) capsule 100 mg  100 mg Oral HS PRN Mayte Abdi MD       • multivitamin-minerals (CENTRUM) tablet 1 tablet  1 tablet Oral Daily Rhea Sewell PA-C   1 tablet at 06/19/23 8579   • thiamine tablet 100 mg  100 mg Oral Daily Princess Campoverde RomanEULALIA hamptonC   100 mg at 06/19/23 0844   • vancomycin (VANCOCIN) IVPB (premix in dextrose) 1,000 mg 200 mL  1,000 mg Intravenous Q12H ANUSHA Stanford-C 200 mL/hr at 06/19/23 0404 1,000 mg at 06/19/23 0404      No medications prior to admission  Labs:  Results from last 7 days   Lab Units 06/19/23  0238 06/18/23  0456 06/17/23  0944   WBC Thousand/uL 4 90 7 42 7 92   HEMOGLOBIN g/dL 12 7 12 7 12 7   HEMATOCRIT % 38 1 39 4 38 8   PLATELETS Thousands/uL 223 190 175   NEUTROS PCT % 64 72 77*   LYMPHS PCT % 19 14 11*   MONOS PCT % 14* 11 10   EOS PCT % 2 2 1     Results from last 7 days   Lab Units 06/19/23  0238 06/18/23  0456 06/17/23  0944 06/14/23  0517 06/13/23  0559   POTASSIUM mmol/L 3 7 3 8 4 4 3 3* 3 5   CHLORIDE mmol/L 101 103 100 99 99   CO2 mmol/L 27 23 29 26 26   BUN mg/dL 7 7 10 6 5   CREATININE mg/dL 0 80 0 71 0 94 0 77 0 83   CALCIUM mg/dL 9 0 8 9 9 2 8 6 8 9   ALK PHOS U/L  --   --   --  90 103   ALT U/L  --   --   --  17 20   AST U/L  --   --   --  21 28     No results found for: \"TROPONINI\", \"CKMB\", \"CKTOTAL\"      Lab Results   Component Value Date    BLOODCX Received in Microbiology Lab  Culture in Progress  06/19/2023    BLOODCX Received in Microbiology Lab  Culture in Progress  06/19/2023    BLOODCX No Growth at 24 hrs  06/17/2023         Imaging:  No results found for this or any previous visit  No results found for this or any previous visit        Last 24 Hours " Medication List:   Current Facility-Administered Medications   Medication Dose Route Frequency Provider Last Rate   • acetaminophen  650 mg Oral Q6H PRN David Sandhu PA-C     • cefTRIAXone  1,000 mg Intravenous Q24H Wall Leghorn Viv, PA-C 1,000 mg (06/18/23 1506)   • diphenhydrAMINE  25 mg Oral Q6H PRN David Sandhu PA-C     • enoxaparin  40 mg Subcutaneous Daily Wall Leghorn Viv PA-C     • folic acid  1 mg Oral Daily Wall Leghorn Viv, PA-C     • gabapentin  100 mg Oral HS PRN Dimitri Rinne, MD     • multivitamin-minerals  1 tablet Oral Daily Wall Leghorn Viv PA-C     • thiamine  100 mg Oral Daily Wall Leghorn Viv, [place]     • vancomycin  1,000 mg Intravenous Q12H Wall Leghorn Viv, PA-C 1,000 mg (06/19/23 0404)        Today, Patient Was Seen By: Yakelin Guerrero MD    ** Please Note: Dictation voice to text software may have been used in the creation of this document   **

## 2023-06-20 LAB
ANION GAP SERPL CALCULATED.3IONS-SCNC: 9 MMOL/L (ref 4–13)
BACTERIA SPEC BFLD CULT: NO GROWTH
BASOPHILS # BLD AUTO: 0.03 THOUSANDS/ÂΜL (ref 0–0.1)
BASOPHILS NFR BLD AUTO: 1 % (ref 0–1)
BUN SERPL-MCNC: 8 MG/DL (ref 5–25)
CALCIUM SERPL-MCNC: 9.6 MG/DL (ref 8.4–10.2)
CHLORIDE SERPL-SCNC: 103 MMOL/L (ref 96–108)
CO2 SERPL-SCNC: 23 MMOL/L (ref 21–32)
CREAT SERPL-MCNC: 0.72 MG/DL (ref 0.6–1.3)
EOSINOPHIL # BLD AUTO: 0 THOUSAND/ÂΜL (ref 0–0.61)
EOSINOPHIL NFR BLD AUTO: 0 % (ref 0–6)
ERYTHROCYTE [DISTWIDTH] IN BLOOD BY AUTOMATED COUNT: 11.2 % (ref 11.6–15.1)
GFR SERPL CREATININE-BSD FRML MDRD: 102 ML/MIN/1.73SQ M
GLUCOSE SERPL-MCNC: 128 MG/DL (ref 65–140)
GRAM STN SPEC: NORMAL
HCT VFR BLD AUTO: 43.2 % (ref 36.5–49.3)
HGB BLD-MCNC: 14.4 G/DL (ref 12–17)
IMM GRANULOCYTES # BLD AUTO: 0.01 THOUSAND/UL (ref 0–0.2)
IMM GRANULOCYTES NFR BLD AUTO: 0 % (ref 0–2)
LYMPHOCYTES # BLD AUTO: 0.83 THOUSANDS/ÂΜL (ref 0.6–4.47)
LYMPHOCYTES NFR BLD AUTO: 15 % (ref 14–44)
MAGNESIUM SERPL-MCNC: 2.2 MG/DL (ref 1.9–2.7)
MCH RBC QN AUTO: 33.6 PG (ref 26.8–34.3)
MCHC RBC AUTO-ENTMCNC: 33.3 G/DL (ref 31.4–37.4)
MCV RBC AUTO: 101 FL (ref 82–98)
MONOCYTES # BLD AUTO: 0.12 THOUSAND/ÂΜL (ref 0.17–1.22)
MONOCYTES NFR BLD AUTO: 2 % (ref 4–12)
NEUTROPHILS # BLD AUTO: 4.69 THOUSANDS/ÂΜL (ref 1.85–7.62)
NEUTS SEG NFR BLD AUTO: 82 % (ref 43–75)
NRBC BLD AUTO-RTO: 0 /100 WBCS
PLATELET # BLD AUTO: 308 THOUSANDS/UL (ref 149–390)
PMV BLD AUTO: 9.1 FL (ref 8.9–12.7)
POTASSIUM SERPL-SCNC: 4.5 MMOL/L (ref 3.5–5.3)
RBC # BLD AUTO: 4.28 MILLION/UL (ref 3.88–5.62)
SODIUM SERPL-SCNC: 135 MMOL/L (ref 135–147)
WBC # BLD AUTO: 5.68 THOUSAND/UL (ref 4.31–10.16)

## 2023-06-20 PROCEDURE — 85025 COMPLETE CBC W/AUTO DIFF WBC: CPT | Performed by: STUDENT IN AN ORGANIZED HEALTH CARE EDUCATION/TRAINING PROGRAM

## 2023-06-20 PROCEDURE — 83735 ASSAY OF MAGNESIUM: CPT | Performed by: INTERNAL MEDICINE

## 2023-06-20 PROCEDURE — 80048 BASIC METABOLIC PNL TOTAL CA: CPT | Performed by: STUDENT IN AN ORGANIZED HEALTH CARE EDUCATION/TRAINING PROGRAM

## 2023-06-20 PROCEDURE — 99232 SBSQ HOSP IP/OBS MODERATE 35: CPT | Performed by: INTERNAL MEDICINE

## 2023-06-20 PROCEDURE — 99232 SBSQ HOSP IP/OBS MODERATE 35: CPT | Performed by: PODIATRIST

## 2023-06-20 RX ADMIN — THIAMINE HCL TAB 100 MG 100 MG: 100 TAB at 08:54

## 2023-06-20 RX ADMIN — ACETAMINOPHEN 650 MG: 325 TABLET, FILM COATED ORAL at 21:13

## 2023-06-20 RX ADMIN — PREDNISONE 40 MG: 20 TABLET ORAL at 08:54

## 2023-06-20 RX ADMIN — CEFTRIAXONE 1000 MG: 1 INJECTION, SOLUTION INTRAVENOUS at 15:44

## 2023-06-20 RX ADMIN — VANCOMYCIN HYDROCHLORIDE 1000 MG: 1 INJECTION, SOLUTION INTRAVENOUS at 17:30

## 2023-06-20 RX ADMIN — MULTIPLE VITAMINS W/ MINERALS TAB 1 TABLET: TAB ORAL at 08:54

## 2023-06-20 RX ADMIN — VANCOMYCIN HYDROCHLORIDE 1000 MG: 1 INJECTION, SOLUTION INTRAVENOUS at 04:42

## 2023-06-20 RX ADMIN — FOLIC ACID 1 MG: 1 TABLET ORAL at 08:54

## 2023-06-20 NOTE — PROGRESS NOTES
[place]  Progress Note  Name: Jethro Cline  MRN: [id number]  Unit/Bed#: -01 I Date of Admission: 6/17/2023   Date of Service: 6/20/2023 I Hospital Day: 1    Assessment/Plan   Peripheral neuropathy  Assessment & Plan  · History of peripheral neuropathy in bilateral lower extremities  · Ambulates with cane at baseline  · Fall precautions    Alcohol use disorder, severe, dependence ([facility] [address] )  Assessment & Plan  · Notes recent alcohol abuse history upwards of 6 beers daily  · Was discharged from [address] [place] [address] detox unit on 6/14  · Denies recent alcohol use since discharge -had been discharged to inpatient rehab at [facility]  · Thiamine, folic acid    * Swelling of right foot  Assessment & Plan  · Presented to the emergency department due to right foot erythema, swelling and discomfort x2 days  · S/p arthrocentesis, fluid studies pending  · Does have significant alcohol use history - ? Gout however uric acid was normal  · Received allopurinol and colchicine in the ED  · Lyme titer, rheumatoid factor obtained in the ED and pending  · Blood cultures growing gram-positive cocci in clusters  · Repeat blood cultures are negative to date  · Does not meet any SIRS criteria  · ESR 35 CRP 71 5  · Received IV doxycycline in the ED  · Continue with IV rocephin/vancomycin  · MRI completed and reviewed with podiatry  · Podiatry follow-up  · Patient started on prednisone 40 mg daily 6/19 per podiatry recommendation  · Trend WBC and fever curve  · Continue IV antibiotics  · Continue rest of the management as per podiatry           Labs & Imaging: I have personally reviewed pertinent reports  VTE Prophylaxis: in place  Code Status:   Level 1 - Full Code    Patient Centered Rounds: I have performed bedside rounds with nursing staff today      Discussions with Specialists or Other Care Team Provider: Podiatry    Education and Discussions with Family / Patient: Patient will "update his family  I offered to call    Current Length of Stay: 1 day(s)    Current Patient Status: Inpatient   Certification Statement: The patient will continue to require additional inpatient hospital stay due to see my assessment and plan  Subjective:   Patient is seen and examined at bedside  Denies any new complaints  Pain is improved  Afebrile  All other ROS are negative  Objective:    Vitals: Blood pressure 135/86, pulse 86, temperature (!) 97 3 °F (36 3 °C), resp  rate 16, height 5' 8\" (1 727 m), weight 74 8 kg (165 lb), SpO2 98 %  ,Body mass index is 25 09 kg/m²  SPO2 RA Rest    Flowsheet Row ED to Hosp-Admission (Current) from 6/17/2023 in [facility] [address] Med Surg Unit   SpO2 98 %   SpO2 Activity At Rest   O2 Device None (Room air)   O2 Flow Rate --        I&O: No intake or output data in the 24 hours ending 06/20/23 0758    Physical Exam:    General- Alert, lying comfortably in bed  Not in any acute distress  Neck- Supple, No JVD  CVS- regular, S1 and S2 normal  Chest- Bilateral Air entry, No rhochi, crackles or wheezing present  Abdomen- soft, nontender, not distended, no guarding or rigidity, BS+  Extremities-  No pedal edema, No calf tenderness  Right foot-extremity- First MTP -improving erythema, swelling and tenderness  CNS-   Alert, awake and orientedx3  No focal deficits present  Invasive Devices     Peripheral Intravenous Line  Duration           Peripheral IV 06/18/23 Right;Ventral (anterior) Forearm 1 day                      Social History  reviewed  History reviewed  No pertinent family history   reviewed    Meds:  Current Facility-Administered Medications   Medication Dose Route Frequency Provider Last Rate Last Admin   • acetaminophen (TYLENOL) tablet 650 mg  650 mg Oral Q6H PRN Sulaiman Slade PA-C   650 mg at 06/19/23 2048   • cefTRIAXone (ROCEPHIN) IVPB (premix in dextrose) 1,000 mg 50 mL  1,000 mg Intravenous Q24H Ade Gaona PA-C 100 mL/hr at " "06/19/23 1416 1,000 mg at 06/19/23 1416   • diphenhydrAMINE (BENADRYL) tablet 25 mg  25 mg Oral Q6H PRN Deshaun Shah PA-C       • enoxaparin (LOVENOX) subcutaneous injection 40 mg  40 mg Subcutaneous Daily Daysi Nam PA-C   40 mg at 60/45/21 1613   • folic acid (FOLVITE) tablet 1 mg  1 mg Oral Daily Deshaun Shah PA-C   1 mg at 06/19/23 0845   • gabapentin (NEURONTIN) capsule 100 mg  100 mg Oral HS PRN Bob Gaona MD       • multivitamin-minerals (CENTRUM) tablet 1 tablet  1 tablet Oral Daily Deshaun Shah PA-C   1 tablet at 06/19/23 0787   • predniSONE tablet 40 mg  40 mg Oral Daily Augie Herrera MD   40 mg at 06/19/23 1851   • thiamine tablet 100 mg  100 mg Oral Daily Daysi Gaona PA-C   100 mg at 06/19/23 0844   • vancomycin (VANCOCIN) IVPB (premix in dextrose) 1,000 mg 200 mL  1,000 mg Intravenous Q12H Daysi Gaona PA-C 200 mL/hr at 06/20/23 0442 1,000 mg at 06/20/23 0442      No medications prior to admission  Labs:  Results from last 7 days   Lab Units 06/20/23  0452 06/19/23  0238 06/18/23  0456   WBC Thousand/uL 5 68 4 90 7 42   HEMOGLOBIN g/dL 14 4 12 7 12 7   HEMATOCRIT % 43 2 38 1 39 4   PLATELETS Thousands/uL 308 223 190   NEUTROS PCT % 82* 64 72   LYMPHS PCT % 15 19 14   MONOS PCT % 2* 14* 11   EOS PCT % 0 2 2     Results from last 7 days   Lab Units 06/20/23  0452 06/19/23  0238 06/18/23  0456 06/17/23  0944 06/14/23  0517   POTASSIUM mmol/L 4 5 3 7 3 8   < > 3 3*   CHLORIDE mmol/L 103 101 103   < > 99   CO2 mmol/L 23 27 23   < > 26   BUN mg/dL 8 7 7   < > 6   CREATININE mg/dL 0 72 0 80 0 71   < > 0 77   CALCIUM mg/dL 9 6 9 0 8 9   < > 8 6   ALK PHOS U/L  --   --   --   --  90   ALT U/L  --   --   --   --  17   AST U/L  --   --   --   --  21    < > = values in this interval not displayed       No results found for: \"TROPONINI\", \"CKMB\", \"CKTOTAL\"      Lab Results   Component Value Date    BLOODCX No Growth at 24 hrs  06/19/2023    BLOODCX No Growth at 24 " hrs  06/19/2023    BLOODCX No Growth at 48 hrs  06/17/2023         Imaging:  No results found for this or any previous visit  No results found for this or any previous visit  Last 24 Hours Medication List:   Current Facility-Administered Medications   Medication Dose Route Frequency Provider Last Rate   • acetaminophen  650 mg Oral Q6H PRN Rilla Shoulder, PA-C     • cefTRIAXone  1,000 mg Intravenous Q24H [place] Viv, PA-C 1,000 mg (06/19/23 1416)   • diphenhydrAMINE  25 mg Oral Q6H PRN Rilla Shoulder, PA-C     • enoxaparin  40 mg Subcutaneous Daily Isha Gowers Viv, PA-C     • folic acid  1 mg Oral Daily Isha Goselin Nam, PA-C     • gabapentin  100 mg Oral HS PRN Deloris Craig MD     • multivitamin-minerals  1 tablet Oral Daily Rilla Shoulder, PA-C     • predniSONE  40 mg Oral Daily Margi Coleman MD     • thiamine  100 mg Oral Daily Isha Gowers Viv, PA-C     • vancomycin  1,000 mg Intravenous Q12H [place] Viv, PA-C 1,000 mg (06/20/23 6842)        Today, Patient Was Seen By: Margi Coleman MD    ** Please Note: Dictation voice to text software may have been used in the creation of this document   **

## 2023-06-20 NOTE — ASSESSMENT & PLAN NOTE
· Notes recent alcohol abuse history upwards of 6 beers daily  · Was discharged from [address] [place] [address] on 6/14  · Denies recent alcohol use since discharge -had been discharged to inpatient rehab at [place] [address]  · Thiamine, folic acid

## 2023-06-20 NOTE — PROGRESS NOTES
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  2  Pain and swelling of right foot, history of peripheral neuropathy, patient's relates this is secondary to COVID, history of alcohol abuse    Plan:  1  Patient examined at bedside  2  Continue steroid taper to completion per primary team   3   As patient has significant decrease in pain, we will continue with steroid taper and have ruled out any infectious process, I personally reviewed patient's labs, x-rays, MRI, synovial fluid analysis, ESR, CRP negative for Lyme, negative for rheumatoid, negative for uric acid, blood cultures  4   I discussed with patient his pain is likely exacerbated from his peripheral neuropathy causing hypersensitivity, I recommend patient have an outpatient follow-up with neurology for his peripheral neuropathy for long-term management versus pain management consultation  5   Patient will follow-up outpatient with Dr Nuria Lay, at which time if he has completed steroid taper and continues with pain or discomfort, a local steroid injection might be offered  6   We discussed shoe gear modification, patient's current work boots are 1years old, I recommended that he get a new pair of work boots that are wider, deeper with a stiff sole and adding memory foam insert to help accommodate his DJD of first MTPJ   7   I explained if he fails all conservative therapy he might benefit from a first MTPJ fusion which will be discussed outpatient  8   We will continue to follow this patient is in house      Subjective/Objective   Chief Complaint:   Chief Complaint   Patient presents with   • Foot Pain     Patient complaint of foot pain and swelling x 2 days        Subjective: Patient seen at bedside for right greater than left first MTPJ pain, he states now since starting steroid taper his pain is averaging "about 6-7/10, when he does get a sharp shooting pain and goes to 10 out of 10 but this quickly resolves  Blood pressure 107/79, pulse 102, temperature (!) 97 3 °F (36 3 °C), resp  rate 16, height 5' 8\" (1 727 m), weight 74 8 kg (165 lb), SpO2 94 %  ,Body mass index is 25 09 kg/m²  Invasive Devices     Peripheral Intravenous Line  Duration           Peripheral IV 06/18/23 Right;Ventral (anterior) Forearm 2 days                Physical Exam:   General appearance: alert, cooperative and no distress    Lower Extremity:  DP, PT 1 out of 4 bilateral  No edema or erythema is noted to first MTPJ bilaterally  + Pain on palpation and range of motion of first MTPJ with minimal joint crepitus note right foot, left first MTPJ is without pain and crepitus and within normal limits  No open wounds are noted                Lab, Imaging and other studies:   Admission on 06/17/2023   Component Date Value   • WBC 06/17/2023 7 92    • RBC 06/17/2023 3 78 (L)    • Hemoglobin 06/17/2023 12 7    • Hematocrit 06/17/2023 38 8    • MCV 06/17/2023 103 (H)    • MCH 06/17/2023 33 6    • MCHC 06/17/2023 32 7    • RDW 06/17/2023 11 3 (L)    • MPV 06/17/2023 8 9    • Platelets 01/18/3640 175    • nRBC 06/17/2023 0    • Neutrophils Relative 06/17/2023 77 (H)    • Immat GRANS % 06/17/2023 0    • Lymphocytes Relative 06/17/2023 11 (L)    • Monocytes Relative 06/17/2023 10    • Eosinophils Relative 06/17/2023 1    • Basophils Relative 06/17/2023 1    • Neutrophils Absolute 06/17/2023 6 06    • Immature Grans Absolute 06/17/2023 0 03    • Lymphocytes Absolute 06/17/2023 0 89    • Monocytes Absolute 06/17/2023 0 80    • Eosinophils Absolute 06/17/2023 0 10    • Basophils Absolute 06/17/2023 0 04    • Sed Rate 06/17/2023 35 (H)    • Sodium 06/17/2023 136    • Potassium 06/17/2023 4 4    • Chloride 06/17/2023 100    • CO2 06/17/2023 29    • ANION GAP 06/17/2023 7    • BUN 06/17/2023 10    • Creatinine 06/17/2023 0 94    • Glucose 06/17/2023 100    • " Calcium 06/17/2023 9 2    • eGFR 06/17/2023 89    • CRP 06/17/2023 71 5 (H)    • Lyme Total Antibodies 06/17/2023 <0 2    • Uric Acid 06/17/2023 5 8    • Site 06/17/2023 SYNOVIAL    • Color, Fluid 06/17/2023 Bloody    • WBC, Fluid 06/17/2023 12,645    • Body Fluid Culture, Ster* 06/17/2023 No growth    • Gram Stain Result 06/17/2023 No Polys or Bacteria seen    • Rheumatoid Factor 06/17/2023 Negative    • Blood Culture 06/17/2023 No Growth at 48 hrs      • Blood Culture 06/17/2023 Staphylococcus coagulase negative (A)    • Gram Stain Result 06/17/2023 Gram positive cocci in clusters (A)    • Total Counted 06/17/2023 100    • Neutrophil % Synovial 06/17/2023 98    • Monocyte % Synovial 06/17/2023 2    • Sodium 06/18/2023 135    • Potassium 06/18/2023 3 8    • Chloride 06/18/2023 103    • CO2 06/18/2023 23    • ANION GAP 06/18/2023 9    • BUN 06/18/2023 7    • Creatinine 06/18/2023 0 71    • Glucose 06/18/2023 65    • Calcium 06/18/2023 8 9    • eGFR 06/18/2023 103    • WBC 06/18/2023 7 42    • RBC 06/18/2023 3 80 (L)    • Hemoglobin 06/18/2023 12 7    • Hematocrit 06/18/2023 39 4    • MCV 06/18/2023 104 (H)    • MCH 06/18/2023 33 4    • MCHC 06/18/2023 32 2    • RDW 06/18/2023 11 4 (L)    • MPV 06/18/2023 9 4    • Platelets 34/49/4087 190    • nRBC 06/18/2023 0    • Neutrophils Relative 06/18/2023 72    • Immat GRANS % 06/18/2023 0    • Lymphocytes Relative 06/18/2023 14    • Monocytes Relative 06/18/2023 11    • Eosinophils Relative 06/18/2023 2    • Basophils Relative 06/18/2023 1    • Neutrophils Absolute 06/18/2023 5 37    • Immature Grans Absolute 06/18/2023 0 02    • Lymphocytes Absolute 06/18/2023 1 02    • Monocytes Absolute 06/18/2023 0 83    • Eosinophils Absolute 06/18/2023 0 14    • Basophils Absolute 06/18/2023 0 04    • Staphylococcus 06/17/2023 Detected (A)    • Sodium 06/19/2023 135    • Potassium 06/19/2023 3 7    • Chloride 06/19/2023 101    • CO2 06/19/2023 27    • ANION GAP 06/19/2023 7    • BUN 06/19/2023 7    • Creatinine 06/19/2023 0 80    • Glucose 06/19/2023 92    • Glucose, Fasting 06/19/2023 92    • Calcium 06/19/2023 9 0    • eGFR 06/19/2023 98    • Blood Culture 06/19/2023 No Growth at 24 hrs  • Blood Culture 06/19/2023 No Growth at 24 hrs      • WBC 06/19/2023 4 90    • RBC 06/19/2023 3 77 (L)    • Hemoglobin 06/19/2023 12 7    • Hematocrit 06/19/2023 38 1    • MCV 06/19/2023 101 (H)    • MCH 06/19/2023 33 7    • MCHC 06/19/2023 33 3    • RDW 06/19/2023 11 1 (L)    • MPV 06/19/2023 9 3    • Platelets 54/64/1262 223    • nRBC 06/19/2023 0    • Neutrophils Relative 06/19/2023 64    • Immat GRANS % 06/19/2023 0    • Lymphocytes Relative 06/19/2023 19    • Monocytes Relative 06/19/2023 14 (H)    • Eosinophils Relative 06/19/2023 2    • Basophils Relative 06/19/2023 1    • Neutrophils Absolute 06/19/2023 3 17    • Immature Grans Absolute 06/19/2023 0 01    • Lymphocytes Absolute 06/19/2023 0 91    • Monocytes Absolute 06/19/2023 0 67    • Eosinophils Absolute 06/19/2023 0 10    • Basophils Absolute 06/19/2023 0 04    • Vancomycin Rm 06/19/2023 13 5    • WBC 06/20/2023 5 68    • RBC 06/20/2023 4 28    • Hemoglobin 06/20/2023 14 4    • Hematocrit 06/20/2023 43 2    • MCV 06/20/2023 101 (H)    • MCH 06/20/2023 33 6    • MCHC 06/20/2023 33 3    • RDW 06/20/2023 11 2 (L)    • MPV 06/20/2023 9 1    • Platelets 09/23/2921 308    • nRBC 06/20/2023 0    • Neutrophils Relative 06/20/2023 82 (H)    • Immat GRANS % 06/20/2023 0    • Lymphocytes Relative 06/20/2023 15    • Monocytes Relative 06/20/2023 2 (L)    • Eosinophils Relative 06/20/2023 0    • Basophils Relative 06/20/2023 1    • Neutrophils Absolute 06/20/2023 4 69    • Immature Grans Absolute 06/20/2023 0 01    • Lymphocytes Absolute 06/20/2023 0 83    • Monocytes Absolute 06/20/2023 0 12 (L)    • Eosinophils Absolute 06/20/2023 0 00    • Basophils Absolute 06/20/2023 0 03    • Sodium 06/20/2023 135    • Potassium 06/20/2023 4 5    • Chloride 06/20/2023 103    • CO2 06/20/2023 23    • ANION GAP 06/20/2023 9    • BUN 06/20/2023 8    • Creatinine 06/20/2023 0 72    • Glucose 06/20/2023 128    • Calcium 06/20/2023 9 6    • eGFR 06/20/2023 102    • Magnesium 06/20/2023 2 2      Imaging: I have personally reviewed pertinent films in PACS  EKG, Pathology, and Other Studies: I have personally reviewed pertinent reports    VTE Pharmacologic Prophylaxis: Enoxaparin (Lovenox)  VTE Mechanical Prophylaxis: sequential compression device

## 2023-06-20 NOTE — ASSESSMENT & PLAN NOTE
· Presented to the emergency department due to right foot erythema, swelling and discomfort x2 days  · S/p arthrocentesis, fluid studies pending  · Does have significant alcohol use history - ?  Gout however uric acid was normal  · Received allopurinol and colchicine in the ED  · Lyme titer, rheumatoid factor obtained in the ED and pending  · Blood cultures growing gram-positive cocci in clusters  · Repeat blood cultures are negative to date  · Does not meet any SIRS criteria  · ESR 35 CRP 71 5  · Received IV doxycycline in the ED  · Continue with IV rocephin/vancomycin  · MRI completed and reviewed with podiatry  · Podiatry follow-up  · Patient started on prednisone 40 mg daily 6/19 per podiatry recommendation  · Trend WBC and fever curve  · Continue IV antibiotics  · Continue rest of the management as per podiatry

## 2023-06-20 NOTE — PROGRESS NOTES
Kahlil Taylor is a 62 y o  male who is currently ordered Vancomycin IV with management by the Pharmacy Consult service  Relevant clinical data and objective / subjective history reviewed  Vancomycin Assessment:  Indication and Goal AUC/Trough: Bone/joint infection (goal -600, trough >10)  Clinical Status: stable  Micro:     Renal Function:  SCr: 0 72 mg/dL  CrCl: 108 2 mL/min  Renal replacement: Not on dialysis  Days of Therapy: 4  Current Dose: 1000mg IV Q12H  Vancomycin Plan:  New Dosing: No change  Estimated AUC: 457 mcg*hr/mL  Estimated Trough: 13 7 mcg/mL  Next Level: With AM labs at 0530 on 6/26/23  Renal Function Monitoring: Daily BMP and [place] will continue to follow closely for s/sx of nephrotoxicity, infusion reactions and appropriateness of therapy  BMP and CBC will be ordered per protocol  We will continue to follow the patient’s culture results and clinical progress daily      Elisabeth Lawson, Pharmacist

## 2023-06-20 NOTE — PLAN OF CARE
Problem: PAIN - ADULT  Goal: Verbalizes/displays adequate comfort level or baseline comfort level  Description: Interventions:  - Encourage patient to monitor pain and request assistance  - Assess pain using appropriate pain scale  - Administer analgesics based on type and severity of pain and evaluate response  - Implement non-pharmacological measures as appropriate and evaluate response  - Consider cultural and social influences on pain and pain management  - Notify physician/advanced practitioner if interventions unsuccessful or patient reports new pain  Outcome: Progressing      Problem: INFECTION - ADULT  Goal: Absence or prevention of progression during hospitalization  Description: INTERVENTIONS:  - Assess and monitor for signs and symptoms of infection  - Monitor lab/diagnostic results  - Monitor all insertion sites, i e  indwelling lines, tubes, and drains  - Monitor endotracheal if appropriate and nasal secretions for changes in amount and color  - [place] appropriate cooling/warming therapies per order  - Administer medications as ordered  - Instruct and encourage patient and family to use good hand hygiene technique  - Identify and instruct in appropriate isolation precautions for identified infection/condition  Outcome: Progressing     Problem: SAFETY ADULT  Goal: Patient will remain free of falls  Description: INTERVENTIONS:  - Educate patient/family on patient safety including physical limitations  - Instruct patient to call for assistance with activity   - Consult OT/PT to assist with strengthening/mobility   - Keep Call bell within reach  - Keep bed low and locked with side rails adjusted as appropriate  - Keep care items and personal belongings within reach  - Initiate and maintain comfort rounds  - Make Fall Risk Sign visible to staff  - Offer Toileting every  Hours, in advance of need  - Initiate/Maintain alarm  - Obtain necessary fall risk management equipment:   - Apply yellow socks and bracelet for high fall risk patients  - Consider moving patient to room near nurses station  Outcome: Progressing  Goal: Maintain or return to baseline ADL function  Description: INTERVENTIONS:  -  Assess patient's ability to carry out ADLs; assess patient's baseline for ADL function and identify physical deficits which impact ability to perform ADLs (bathing, care of mouth/teeth, toileting, grooming, dressing, etc )  - Assess/evaluate cause of self-care deficits   - Assess range of motion  - Assess patient's mobility; develop plan if impaired  - Assess patient's need for assistive devices and provide as appropriate  - Encourage maximum independence but intervene and supervise when necessary  - Involve family in performance of ADLs  - Assess for home care needs following discharge   - Consider OT consult to assist with ADL evaluation and planning for discharge  - Provide patient education as appropriate  Outcome: Progressing  Goal: Maintains/Returns to pre admission functional level  Description: INTERVENTIONS:  - Perform BMAT or MOVE assessment daily    - Set and communicate daily mobility goal to care team and patient/family/caregiver  - Collaborate with rehabilitation services on mobility goals if consulted  - Perform Range of Motion  times a day  - Reposition patient every  hours    - Dangle patient  times a day  - Stand patient  times a day  - Ambulate patient  times a day  - Out of bed to chair  times a day   - Out of bed for meals  times a day  - Out of bed for toileting  - Record patient progress and toleration of activity level   Outcome: Progressing     Problem: DISCHARGE PLANNING  Goal: Discharge to home or other facility with appropriate resources  Description: INTERVENTIONS:  - Identify barriers to discharge w/patient and caregiver  - Arrange for needed discharge resources and transportation as appropriate  - Identify discharge learning needs (meds, wound care, etc )  - Arrange for interpretive services to assist at discharge as needed  - Refer to Case Management Department for coordinating discharge planning if the patient needs post-hospital services based on physician/advanced practitioner order or complex needs related to functional status, cognitive ability, or social support system  Outcome: Progressing     Problem: Knowledge Deficit  Goal: Patient/family/caregiver demonstrates understanding of disease process, treatment plan, medications, and discharge instructions  Description: Complete learning assessment and assess knowledge base  Interventions:  - Provide teaching at level of understanding  - Provide teaching via preferred learning methods  Outcome: Progressing     Problem: MOBILITY - ADULT  Goal: Maintain or return to baseline ADL function  Description: INTERVENTIONS:  -  Assess patient's ability to carry out ADLs; assess patient's baseline for ADL function and identify physical deficits which impact ability to perform ADLs (bathing, care of mouth/teeth, toileting, grooming, dressing, etc )  - Assess/evaluate cause of self-care deficits   - Assess range of motion  - Assess patient's mobility; develop plan if impaired  - Assess patient's need for assistive devices and provide as appropriate  - Encourage maximum independence but intervene and supervise when necessary  - Involve family in performance of ADLs  - Assess for home care needs following discharge   - Consider OT consult to assist with ADL evaluation and planning for discharge  - Provide patient education as appropriate  Outcome: Progressing  Goal: Maintains/Returns to pre admission functional level  Description: INTERVENTIONS:  - Perform BMAT or MOVE assessment daily    - Set and communicate daily mobility goal to care team and patient/family/caregiver  - Collaborate with rehabilitation services on mobility goals if consulted  - Perform Range of Motion  times a day  - Reposition patient every  hours    - Dangle patient  times a day  - Stand patient  times a day  - Ambulate patient  times a day  - Out of bed to chair  times a day   - Out of bed for meals times a day  - Out of bed for toileting  - Record patient progress and toleration of activity level   Outcome: Progressing

## 2023-06-21 LAB
ANION GAP SERPL CALCULATED.3IONS-SCNC: 7 MMOL/L
BACTERIA BLD CULT: ABNORMAL
BASOPHILS # BLD AUTO: 0.04 THOUSANDS/ÂΜL (ref 0–0.1)
BASOPHILS NFR BLD AUTO: 1 % (ref 0–1)
BUN SERPL-MCNC: 13 MG/DL (ref 5–25)
CALCIUM SERPL-MCNC: 9.3 MG/DL (ref 8.4–10.2)
CHLORIDE SERPL-SCNC: 103 MMOL/L (ref 96–108)
CO2 SERPL-SCNC: 26 MMOL/L (ref 21–32)
CREAT SERPL-MCNC: 0.79 MG/DL (ref 0.6–1.3)
EOSINOPHIL # BLD AUTO: 0.1 THOUSAND/ÂΜL (ref 0–0.61)
EOSINOPHIL NFR BLD AUTO: 1 % (ref 0–6)
ERYTHROCYTE [DISTWIDTH] IN BLOOD BY AUTOMATED COUNT: 11.1 % (ref 11.6–15.1)
GFR SERPL CREATININE-BSD FRML MDRD: 98 ML/MIN/1.73SQ M
GLUCOSE SERPL-MCNC: 103 MG/DL (ref 65–140)
GRAM STN SPEC: ABNORMAL
HCT VFR BLD AUTO: 40.9 % (ref 36.5–49.3)
HGB BLD-MCNC: 13.5 G/DL (ref 12–17)
IMM GRANULOCYTES # BLD AUTO: 0.03 THOUSAND/UL (ref 0–0.2)
IMM GRANULOCYTES NFR BLD AUTO: 0 % (ref 0–2)
LYMPHOCYTES # BLD AUTO: 1.54 THOUSANDS/ÂΜL (ref 0.6–4.47)
LYMPHOCYTES NFR BLD AUTO: 17 % (ref 14–44)
MCH RBC QN AUTO: 33.5 PG (ref 26.8–34.3)
MCHC RBC AUTO-ENTMCNC: 33 G/DL (ref 31.4–37.4)
MCV RBC AUTO: 102 FL (ref 82–98)
MONOCYTES # BLD AUTO: 1.02 THOUSAND/ÂΜL (ref 0.17–1.22)
MONOCYTES NFR BLD AUTO: 12 % (ref 4–12)
NEUTROPHILS # BLD AUTO: 6.13 THOUSANDS/ÂΜL (ref 1.85–7.62)
NEUTS SEG NFR BLD AUTO: 69 % (ref 43–75)
NRBC BLD AUTO-RTO: 0 /100 WBCS
PLATELET # BLD AUTO: 296 THOUSANDS/UL (ref 149–390)
PMV BLD AUTO: 8.7 FL (ref 8.9–12.7)
POTASSIUM SERPL-SCNC: 3.8 MMOL/L (ref 3.5–5.3)
RBC # BLD AUTO: 4.03 MILLION/UL (ref 3.88–5.62)
S AUREUS+CONS DNA BLD POS NAA+NON-PROBE: DETECTED
SODIUM SERPL-SCNC: 136 MMOL/L (ref 135–147)
WBC # BLD AUTO: 8.86 THOUSAND/UL (ref 4.31–10.16)

## 2023-06-21 PROCEDURE — 97163 PT EVAL HIGH COMPLEX 45 MIN: CPT

## 2023-06-21 PROCEDURE — 99232 SBSQ HOSP IP/OBS MODERATE 35: CPT | Performed by: INTERNAL MEDICINE

## 2023-06-21 PROCEDURE — 80048 BASIC METABOLIC PNL TOTAL CA: CPT | Performed by: STUDENT IN AN ORGANIZED HEALTH CARE EDUCATION/TRAINING PROGRAM

## 2023-06-21 PROCEDURE — 85025 COMPLETE CBC W/AUTO DIFF WBC: CPT | Performed by: STUDENT IN AN ORGANIZED HEALTH CARE EDUCATION/TRAINING PROGRAM

## 2023-06-21 PROCEDURE — 97166 OT EVAL MOD COMPLEX 45 MIN: CPT

## 2023-06-21 PROCEDURE — 97116 GAIT TRAINING THERAPY: CPT

## 2023-06-21 PROCEDURE — 99232 SBSQ HOSP IP/OBS MODERATE 35: CPT | Performed by: PODIATRIST

## 2023-06-21 RX ADMIN — ENOXAPARIN SODIUM 40 MG: 100 INJECTION SUBCUTANEOUS at 08:36

## 2023-06-21 RX ADMIN — FOLIC ACID 1 MG: 1 TABLET ORAL at 08:35

## 2023-06-21 RX ADMIN — PREDNISONE 40 MG: 20 TABLET ORAL at 08:35

## 2023-06-21 RX ADMIN — THIAMINE HCL TAB 100 MG 100 MG: 100 TAB at 08:35

## 2023-06-21 RX ADMIN — VANCOMYCIN HYDROCHLORIDE 1000 MG: 1 INJECTION, SOLUTION INTRAVENOUS at 05:00

## 2023-06-21 RX ADMIN — MULTIPLE VITAMINS W/ MINERALS TAB 1 TABLET: TAB ORAL at 08:35

## 2023-06-21 NOTE — PLAN OF CARE
Problem: PAIN - ADULT  Goal: Verbalizes/displays adequate comfort level or baseline comfort level  Description: Interventions:  - Encourage patient to monitor pain and request assistance  - Assess pain using appropriate pain scale  - Administer analgesics based on type and severity of pain and evaluate response  - Implement non-pharmacological measures as appropriate and evaluate response  - Consider cultural and social influences on pain and pain management  - Notify physician/advanced practitioner if interventions unsuccessful or patient reports new pain  Outcome: Progressing     Problem: INFECTION - ADULT  Goal: Absence or prevention of progression during hospitalization  Description: INTERVENTIONS:  - Assess and monitor for signs and symptoms of infection  - Monitor lab/diagnostic results  - Monitor all insertion sites, i e  indwelling lines, tubes, and drains  - Monitor endotracheal if appropriate and nasal secretions for changes in amount and color  - [place] appropriate cooling/warming therapies per order  - Administer medications as ordered  - Instruct and encourage patient and family to use good hand hygiene technique  - Identify and instruct in appropriate isolation precautions for identified infection/condition  Outcome: Progressing

## 2023-06-21 NOTE — OCCUPATIONAL THERAPY NOTE
Occupational Therapy Evaluation     Patient Name: Rodrigue Martinez  [id number] Date: 6/21/2023  Problem List  Principal Problem:    Swelling of right foot  Active Problems:    Alcohol use disorder, severe, dependence ([facility] [address] )    Peripheral neuropathy    Past Medical History  Past Medical History:   Diagnosis Date    Alcohol withdrawal syndrome with complication ([facility] [address] ) 5/85/1751     Past Surgical History  History reviewed  No pertinent surgical history  06/21/23 1424   OT Last Visit   OT Visit Date 06/21/23   Note Type   Note type Evaluation   Pain Assessment   Pain Assessment Tool 0-10   Pain Score 5   Pain Location/Orientation Orientation: Bilateral;Location: Foot   Patient's Stated Pain Goal No pain   Hospital Pain Intervention(s) Ambulation/increased activity   Restrictions/Precautions   Weight Bearing Precautions Per Order No  (Spoke with Dr Palomo Handy - no WBS)   Home Living   Type of Home House  InhibOx)   [facility] to live on main level with bedroom/bathroom; One level;Stairs to enter with rails  (3 ROSA MARIA)   Bathroom Shower/Tub Tub/shower unit   Home Equipment Walker;Cane   Additional Comments Pt uses SPC only for stair management, othewise I for mobility  Pt reports keeping a RW by his bed at night if needed   Prior Function   Level of [place] Independent with ADLs; Independent with functional mobility; Needs assistance with [facility] staff; Other (Comment)  (2 Roommates)   Receives Help From Personal care attendant   General   Family/Caregiver Present No   ADL   Eating Assistance 7  [facility]  7  Independent   Bed Mobility   Additional Comments Received sitting EOB   Transfers   Sit to Stand 5  Supervision   Stand to Sit 5  Supervision   Additional Comments Progressed to Mod I   Functional Mobility   Functional Mobility 5  Supervision   Additional Comments Initially CGA without DME  Pt noted to be scissoring with LOB requiring Min A to correct  Then completed mobility with SPC, continued to require CGA  Then completed mobility with RW at Mod I level  Recommend pt continue to use RW for mobility, pt agreeable  Additional items Rolling walker;SPC   Balance   Static Sitting Good   Dynamic Sitting Good   Static Standing Fair   Dynamic Standing Fair   Ambulatory Fair   Activity Tolerance   Activity Tolerance Patient tolerated treatment well   Medical Staff Randi Cheung, Dr Jesse Gunter, PARVEZ Figueroa   RUE Assessment   RUE Assessment WFL   LUE Assessment   LUE Assessment WFL   Cognition   Overall Cognitive Status WFL   Arousal/Participation Alert   Attention Within functional limits   Orientation Level Oriented X4   Memory Within functional limits   Following Commands Follows multistep commands without difficulty   Assessment   Prognosis Good   Assessment Pt is a 62 y o  male seen for OT evaluation at [place] [address], admitted 6/17/2023 w/ Swelling of right foot  OT completed expanded review of pt's medical and social history  Comorbidities affecting pt's functional performance at time of assessment include: alcohol abuse, peripheral neuropathy, hearing impaired, macrocytosis without anemia  Prior to admission, pt was living at [facility] with [facility] [address]  Pt was independent with ADLs & functional mobility  Upon evaluation, pt presents to OT independent for ADLs & Mod I for mobility with use of RW  Based on findings, pt is of moderate complexity  The patient's raw score on the AM-PAC Daily Activity inpatient short form is 24, standardized score is 57 54, greater than 39 4  Patients at this level are likely to benefit from DC to home  Please refer to the recommendation of the Occupational Therapist for safe DC planning   At this time, OT recommendations at time of discharge are DC with level 4 resources  No further acute OT needs indicated at this time - Recommend pt continue to be OOB for meals, ambulation to/from BR, perform self care tasks, and mobility in hallway with nursing  D/C from OT caseload with above recommendations  Goals   Patient Goals Pt wants to go home   Plan   OT Frequency Eval only   Recommendation   UB Rehab Discharge Recommendation (PT/OT) Level 4   AM-PAC Daily Activity Inpatient   Lower Body Dressing 4   Bathing 4   Toileting 4   Upper Body Dressing 4   Grooming 4   Eating 4   Daily Activity Raw Score 24   Daily Activity Standardized Score (Calc for Raw Score >=11) 57 54   AM-PAC Applied Cognition Inpatient   Following a Speech/Presentation 4   Understanding Ordinary Conversation 4   Taking Medications 4   Remembering Where Things Are Placed or Put Away 4   Remembering List of 4-5 Errands 4   Taking Care of Complicated Tasks 4   Applied Cognition Raw Score 24   Applied Cognition Standardized Score 62 21   End of Consult   Education Provided Yes   Patient Position at End of Consult Seated edge of bed; All needs within reach     Fatimah Kaiser OTR/L

## 2023-06-21 NOTE — ASSESSMENT & PLAN NOTE
· Presented to the emergency department due to right foot erythema, swelling and discomfort x2 days  · S/p arthrocentesis, fluid studies pending  · Does have significant alcohol use history - ?  Gout however uric acid was normal  · Received allopurinol and colchicine in the ED  · Lyme titer, rheumatoid factor obtained in the ED and pending  · Blood cultures growing gram-positive cocci in clusters  · Repeat blood cultures are negative to date  · Does not meet any SIRS criteria  · ESR 35 CRP 71 5  · Received IV doxycycline in the ED  · Continue with IV rocephin/vancomycin  · MRI completed and reviewed with podiatry  · Podiatry follow-up  · Patient started on prednisone 40 mg daily 6/19 per podiatry recommendation  · Trend WBC and fever curve  · Continue IV antibiotics  · Per podiatry no surgical intervention at this point  · Continue rest of the management as per podiatry

## 2023-06-21 NOTE — PROGRESS NOTES
Alejandro Carolina is a 62 y o  male who is currently ordered Vancomycin IV with management by the Pharmacy Consult service  Relevant clinical data and objective / subjective history reviewed  Vancomycin Assessment:  Indication and Goal AUC/Trough: Bone/joint infection (goal -600, trough >10)  Clinical Status: stable  Micro:     Renal Function:  SCr: 0 79 mg/dL  CrCl: 98 6 mL/min  Renal replacement: Not on dialysis  Days of Therapy: 5  Current Dose: 1000mg IV Q12H  Vancomycin Plan:  New Dosing: No change  Estimated AUC: 492 mcg*hr/mL  Estimated Trough: 15 1 mcg/mL  Next Level: with AM labs at 0530 on 6/26/23  Renal Function Monitoring: Daily BMP and UOP  Pharmacy will continue to follow closely for s/sx of nephrotoxicity, infusion reactions and appropriateness of therapy  BMP and CBC will be ordered per protocol  We will continue to follow the patient’s culture results and clinical progress daily      Mary Moura, Pharmacist

## 2023-06-21 NOTE — CONSULTS
The patient’s vancomycin therapy has been completed / discontinued  Thank you for allowing us to take part in this patient's care  Pharmacy will sign-off now; please call or re-consult if there are any questions         Jesus Holland PharmD

## 2023-06-21 NOTE — ASSESSMENT & PLAN NOTE
· Notes recent alcohol abuse history upwards of 6 beers daily  · Was discharged from [address] [place] [address] on 6/14  · Denies recent alcohol use since discharge -had been discharged to inpatient rehab at [place] [address]  · Thiamine, folic acid

## 2023-06-21 NOTE — PROGRESS NOTES
[facility]  Progress Note  Name: Artemio Awad  MRN: [id number]  Unit/Bed#: -01 I Date of Admission: 6/17/2023   Date of Service: 6/21/2023 I Hospital Day: 2    Assessment/Plan   Peripheral neuropathy  Assessment & Plan  · History of peripheral neuropathy in bilateral lower extremities  · Ambulates with cane at baseline  · Fall precautions    Alcohol use disorder, severe, dependence ([facility] [address] )  Assessment & Plan  · Notes recent alcohol abuse history upwards of 6 beers daily  · Was discharged from [address] [place] [address] unit on 6/14  · Denies recent alcohol use since discharge -had been discharged to inpatient rehab at [place] [address]  · Thiamine, folic acid    * Swelling of right foot  Assessment & Plan  · Presented to the emergency department due to right foot erythema, swelling and discomfort x2 days  · S/p arthrocentesis, fluid studies pending  · Does have significant alcohol use history - ? Gout however uric acid was normal  · Received allopurinol and colchicine in the ED  · Lyme titer, rheumatoid factor obtained in the ED and pending  · Blood cultures growing gram-positive cocci in clusters  · Repeat blood cultures are negative to date  · Does not meet any SIRS criteria  · ESR 35 CRP 71 5  · Received IV doxycycline in the ED  · Continue with IV rocephin/vancomycin  · MRI completed and reviewed with podiatry  · Podiatry follow-up  · Patient started on prednisone 40 mg daily 6/19 per podiatry recommendation  · Trend WBC and fever curve  · Continue IV antibiotics  · Per podiatry no surgical intervention at this point  · Continue rest of the management as per podiatry           Labs & Imaging: I have personally reviewed pertinent reports  VTE Prophylaxis: in place  Code Status:   Level 1 - Full Code    Patient Centered Rounds: I have performed bedside rounds with nursing staff today      Discussions with Specialists or Other Care Team Provider: Podiatry    Education "and Discussions with Family / Patient: Patient declined family update    Current Length of Stay: 2 day(s)    Current Patient Status: Inpatient   Certification Statement: The patient will continue to require additional inpatient hospital stay due to see my assessment and plan  Subjective:   Patient is seen and examined at bedside  States his pain is improving  Denies any other complaint  Afebrile  All other ROS are negative  Objective:    Vitals: Blood pressure 123/80, pulse 73, temperature (!) 97 2 °F (36 2 °C), resp  rate 16, height 5' 8\" (1 727 m), weight 74 8 kg (165 lb), SpO2 100 %  ,Body mass index is 25 09 kg/m²  SPO2 RA Rest    Flowsheet Row ED to Hosp-Admission (Current) from 6/17/2023 in [address] Med Surg Unit   SpO2 100 %   SpO2 Activity At Rest   O2 Device None (Room air)   O2 Flow Rate --        I&O:     Intake/Output Summary (Last 24 hours) at 6/21/2023 1218  Last data filed at 6/21/2023 0749  Gross per 24 hour   Intake 240 ml   Output --   Net 240 ml       Physical Exam:    General- Alert, lying comfortably in bed  Not in any acute distress  Neck- Supple, No JVD  CVS- regular, S1 and S2 normal  Chest- Bilateral Air entry, No rhochi, crackles or wheezing present  Abdomen- soft, nontender, not distended, no guarding or rigidity, BS+  Extremities-  No pedal edema, No calf tenderness  Right foot-pain is improving with minimal erythema  CNS-   Alert, awake and orientedx3  No focal deficits present  Invasive Devices     Peripheral Intravenous Line  Duration           Peripheral IV 06/18/23 Right;Ventral (anterior) Forearm 2 days                      Social History  reviewed  History reviewed  No pertinent family history   reviewed    Meds:  Current Facility-Administered Medications   Medication Dose Route Frequency Provider Last Rate Last Admin   • acetaminophen (TYLENOL) tablet 650 mg  650 mg Oral Q6H PRN Claudia Aparicio PA-C   650 mg at 06/20/23 2113   • cefTRIAXone " "(ROCEPHIN) IVPB (premix in dextrose) 1,000 mg 50 mL  1,000 mg Intravenous Q24H Meldon Mariah, PA-C 100 mL/hr at 06/20/23 1544 1,000 mg at 06/20/23 1544   • diphenhydrAMINE (BENADRYL) tablet 25 mg  25 mg Oral Q6H PRN Meldon Mariah, PA-C       • enoxaparin (LOVENOX) subcutaneous injection 40 mg  40 mg Subcutaneous Daily Meldon Mariah, PA-C   40 mg at 83/66/29 5620   • folic acid (FOLVITE) tablet 1 mg  1 mg Oral Daily Meldon Mariah, PA-C   1 mg at 06/21/23 0495   • gabapentin (NEURONTIN) capsule 100 mg  100 mg Oral HS PRN Ita Clark MD       • multivitamin-minerals (CENTRUM) tablet 1 tablet  1 tablet Oral Daily Meldon Mariah, PA-C   1 tablet at 06/21/23 9687   • predniSONE tablet 40 mg  40 mg Oral Daily Morgan Warner MD   40 mg at 06/21/23 8393   • thiamine tablet 100 mg  100 mg Oral Daily Akins Laurens Gyarmaty, PA-C   100 mg at 06/21/23 7096   • vancomycin (VANCOCIN) IVPB (premix in dextrose) 1,000 mg 200 mL  1,000 mg Intravenous Q12H Akins Laurens Gyarmaty, PA-C 200 mL/hr at 06/21/23 0500 1,000 mg at 06/21/23 0500      No medications prior to admission         Labs:  Results from last 7 days   Lab Units 06/21/23  0453 06/20/23  0452 06/19/23  0238   WBC Thousand/uL 8 86 5 68 4 90   HEMOGLOBIN g/dL 13 5 14 4 12 7   HEMATOCRIT % 40 9 43 2 38 1   PLATELETS Thousands/uL 296 308 223   NEUTROS PCT % 69 82* 64   LYMPHS PCT % 17 15 19   MONOS PCT % 12 2* 14*   EOS PCT % 1 0 2     Results from last 7 days   Lab Units 06/21/23  0453 06/20/23  0452 06/19/23  0238   POTASSIUM mmol/L 3 8 4 5 3 7   CHLORIDE mmol/L 103 103 101   CO2 mmol/L 26 23 27   BUN mg/dL 13 8 7   CREATININE mg/dL 0 79 0 72 0 80   CALCIUM mg/dL 9 3 9 6 9 0     No results found for: \"TROPONINI\", \"CKMB\", \"CKTOTAL\"      Lab Results   Component Value Date    BLOODCX No Growth at 48 hrs  06/19/2023    BLOODCX No Growth at 48 hrs  06/19/2023    BLOODCX No Growth at 72 hrs  06/17/2023    BLOODCX Staphylococcus coagulase negative (A) 06/17/2023 " Imaging:  No results found for this or any previous visit  No results found for this or any previous visit  Last 24 Hours Medication List:   Current Facility-Administered Medications   Medication Dose Route Frequency Provider Last Rate   • acetaminophen  650 mg Oral Q6H PRN Rohan Galarza PA-C     • cefTRIAXone  1,000 mg Intravenous Q24H ANUSHA Valle-C 1,000 mg (06/20/23 6114)   • diphenhydrAMINE  25 mg Oral Q6H PRN Rohan Galarza PA-C     • enoxaparin  40 mg Subcutaneous Daily Sonia Nam PA-C     • folic acid  1 mg Oral Daily Sonia Nam PA-C     • gabapentin  100 mg Oral HS PRN Melania Woodward MD     • multivitamin-minerals  1 tablet Oral Daily Rohan Galarza PA-C     • predniSONE  40 mg Oral Daily Victory Bamberger, MD     • thiamine  100 mg Oral Daily Sonia Nam PA-C     • vancomycin  1,000 mg Intravenous Q12H EULALIA ValleC 1,000 mg (06/21/23 0500)        Today, Patient Was Seen By: Victory Bamberger, MD    ** Please Note: Dictation voice to text software may have been used in the creation of this document   **

## 2023-06-21 NOTE — PROGRESS NOTES
"Progress Note - Podiatry  Anamaria Myles 62 y o  male MRN: [id number]  Unit/Bed#: -01 Encounter: [id number]    Assessment/Plan:    Inflammatory arthritis first MPJ bilateral (R>L)  · Labs and case reviewed with internal medicine Dr Elen Cardoza  · Low suspicion for infectious process at this point,  Most recent x2 blood cultures show no growth after 48 hours  · Plan for continued taper of prednisone over the next week with outpatient follow-up with podiatry  · Antibiotics were discontinued this morning, plan to monitor overnight and as long as no flareup occurs plan is for discharge tomorrow morning  Pain bilateral feet, peripheral neuropathy, history of alcohol abuse  · Managed per internal medicine    Subjective/Objective   Chief Complaint:   Chief Complaint   Patient presents with   • Foot Pain     Patient complaint of foot pain and swelling x 2 days        Subjective: 51-year-old male resting comfortably in bed reports much less pain from both great toe joints though with the left still is problematic the right has improved with diminished swelling and redness  Blood pressure 123/80, pulse 73, temperature (!) 97 2 °F (36 2 °C), resp  rate 16, height 5' 8\" (1 727 m), weight 74 8 kg (165 lb), SpO2 100 %  ,Body mass index is 25 09 kg/m²  Invasive Devices     Peripheral Intravenous Line  Duration           Peripheral IV 06/18/23 Right;Ventral (anterior) Forearm 2 days                Physical Exam:   General appearance: alert, cooperative and no distress  Neuro/Vascular: Grossly intact with palpable pedal pulses bilaterally, epicritic sensations diminished with changes consistent with peripheral neuropathy tingling paresthesias noted  Extremity:   Right foot: No further calor erythema and edema from the first MPJ  Substantially improved with increasing range of motion  Left foot: Continued pain with palpation range of motion of the first MPJ though no significant edema or erythema is present         " "         Labs and other studies:   CBC w/diff  Results from last 7 days   Lab Units 06/21/23  0453   WBC Thousand/uL 8 86   HEMOGLOBIN g/dL 13 5   HEMATOCRIT % 40 9   PLATELETS Thousands/uL 296   NEUTROS PCT % 69   LYMPHS PCT % 17   MONOS PCT % 12   EOS PCT % 1     BMP  Results from last 7 days   Lab Units 06/21/23  0453   POTASSIUM mmol/L 3 8   CHLORIDE mmol/L 103   CO2 mmol/L 26   BUN mg/dL 13   CREATININE mg/dL 0 79   CALCIUM mg/dL 9 3     CMP  Results from last 7 days   Lab Units 06/21/23  0453   POTASSIUM mmol/L 3 8   CHLORIDE mmol/L 103   CO2 mmol/L 26   BUN mg/dL 13   CREATININE mg/dL 0 79   CALCIUM mg/dL 9 3       @Culture@  Lab Results   Component Value Date    BLOODCX No Growth at 48 hrs  06/19/2023    BLOODCX No Growth at 48 hrs  06/19/2023    BLOODCX No Growth at 72 hrs  06/17/2023    BLOODCX Staphylococcus coagulase negative (A) 06/17/2023     No results found for: \"WOUNDCULT\"      Current Facility-Administered Medications:   •  acetaminophen (TYLENOL) tablet 650 mg, 650 mg, Oral, Q6H PRN, David Sandhu PA-C, 650 mg at 06/20/23 2113  •  diphenhydrAMINE (BENADRYL) tablet 25 mg, 25 mg, Oral, Q6H PRN, Zunilda Gaona PA-C  •  enoxaparin (LOVENOX) subcutaneous injection 40 mg, 40 mg, Subcutaneous, Daily, Zunilda Gaona PA-C, 40 mg at 14/98/61 8938  •  folic acid (FOLVITE) tablet 1 mg, 1 mg, Oral, Daily, EULALIA LaC, 1 mg at 06/21/23 3478  •  gabapentin (NEURONTIN) capsule 100 mg, 100 mg, Oral, HS PRN, Dimitri Rinne, MD  •  multivitamin-minerals (CENTRUM) tablet 1 tablet, 1 tablet, Oral, Daily, David Sandhu PA-C, 1 tablet at 06/21/23 6988  •  predniSONE tablet 40 mg, 40 mg, Oral, Daily, Yakelin Guerrero MD, 40 mg at 06/21/23 1523  •  thiamine tablet 100 mg, 100 mg, Oral, Daily, Zunilda Gaona PA-C, 100 mg at 06/21/23 0187    Imaging: I have personally reviewed pertinent films in PACS  EKG, Pathology, and Other Studies: I have personally reviewed pertinent reports    VTE " Pharmacologic Prophylaxis: Enoxaparin (Lovenox)  VTE Mechanical Prophylaxis: sequential compression device    Rochelle Schaeffer DPM

## 2023-06-21 NOTE — CASE MANAGEMENT
Case Management Discharge Planning Note    Patient name Rosalinda Ewingr  Location /-23 MRN [id number]  : 1965 Date 2023       Current Admission Date: 2023  Current Admission Diagnosis:Swelling of right foot   Patient Active Problem List    Diagnosis Date Noted   • Swelling of right foot 2023   • Hearing impaired 2023   • Peripheral neuropathy 2023   • Alcohol use disorder, severe, dependence ([facility] [address] ) 2023   • Macrocytosis without anemia 2023      LOS (days): 2  Geometric Mean LOS (GMLOS) (days):   Days to GMLOS:     OBJECTIVE:  Risk of Unplanned Readmission Score: 9 72         Current admission status: Inpatient   Preferred Pharmacy:   PATIENT/FAMILY REPORTS NO PREFERRED PHARMACY  No address on file      Primary Care Provider: No primary care provider on file  Primary Insurance: ANUSHA FARR PENDING  Secondary Insurance:     DISCHARGE DETAILS:     Spoke with Lulu Love at [facility], and they can accept patient back today as long as he is independent and off the IV antibiotics  Awaiting clearance from PT/OT  Patient needs to be monitored off ABX so dc will be tomorrow

## 2023-06-21 NOTE — PHYSICAL THERAPY NOTE
PHYSICAL THERAPY EVALUATION  NAME: Jack Mejia  AGE:   62 y o  MRN:  [id number]  ADMIT DX: Foot pain [M79 673]  Cellulitis of great toe, left [L03 032]  Septic joint (HCC) [M00 9]  Right foot infection [L08 9]  Transient arthropathy of metatarsophalangeal (MTP) joint of great toe [M12 879]    PMH:   Past Medical History:   Diagnosis Date    Alcohol withdrawal syndrome with complication ([facility] [address] ) 7714     LENGTH OF STAY: 2       23 1425   PT Last Visit   PT Visit Date 23   Note Type   Note type Evaluation   Pain Assessment   Pain Assessment Tool 0-10   Pain Score 5   Pain Location/Orientation Orientation: Bilateral;Location: Foot   Hospital Pain Intervention(s) Repositioned; Ambulation/increased activity   Restrictions/Precautions   Weight Bearing Precautions Per Order No  (Spoke with Dr Violeta Rust WB restrictions)   Other Precautions Fall Risk;Pain   Home Living   Type of [address] to live on main level with bedroom/bathroom; One level;Stairs to enter with rails  (3 ROSA MARIA; FF[employer])   Bathroom Shower/Tub Tub/shower unit   Home Equipment Walker;Cane   Additional Comments Ambulates with mod I and SPC at baseline  Pt reports using RW at night only if needed  Prior Function   Level of [place] Independent with ADLs; Independent with functional mobility; Needs assistance with [address] [place] [address] staff; Other (Comment)   Receives Help From Personal care attendant   General   Family/Caregiver Present No   Cognition   Overall Cognitive Status WFL   Arousal/Participation Cooperative   Attention Within functional limits   Orientation Level Oriented X4   Memory Within functional limits   Following Commands Follows multistep commands without difficulty   Comments Pt identified by name and   Subjective   Subjective Agrees to PT evaluation and is pleasant and cooperative throughout session     RLE Assessment   RLE Assessment X   Strength RLE   RLE Overall Strength 4-/5  (functionally)   LLE Assessment   LLE Assessment X   Strength LLE   LLE Overall Strength 4-/5  (functionally)   Bed Mobility   Supine to Sit Unable to assess  (sitting on EOB pre/post session)   Transfers   Sit to Stand 5  Supervision   Additional items Increased time required;Verbal cues   Stand to Sit 5  Supervision   Additional items Increased time required;Verbal cues   Ambulation/Elevation   Gait pattern Improper Weight shift; Antalgic; Short stride; Step to;Excessively slow   Gait Assistance 4  Minimal assist   Additional items Assist x 1;Verbal cues   Assistive Device Straight cane   Distance ~20` x1   Ambulation/Elevation Additional Comments initially min A without AD ~5`, min/CGA for ambulation with SPC, and mod I for ambulation with RW   Balance   Static Sitting Good   Dynamic Sitting Good   Static Standing Fair   Dynamic Standing Fair   Ambulatory Fair -   Endurance Deficit   Endurance Deficit Yes   Endurance Deficit Description limited ambulation distance, pain   Activity Tolerance   Activity Tolerance Patient limited by pain   Medical Staff Made Aware OT Mar   Nurse Made Aware Per RN, pt appropriate to evaluate   Assessment   Problem List Decreased strength;Decreased endurance; Impaired balance;Decreased mobility; Decreased safety awareness;Pain   Assessment Pt is a 62 y o  male seen for PT evaluation s/p admit to [facility] on 8/18/2022 w/ Swelling of right foot  Order placed for PT  Comorbidities affecting pt's physical performance at time of assessment include: neuropathy  Personal factors affecting pt at time of IE include: steps to enter environment, limited home support and limited insight into impairments  Prior to admission, pt was was independent w/ all functional mobility w/ SPC, lived in one floor environment, had 3 ROSA MARIA (+) railing and lived with roommates   Upon evaluation: Pt requires supervision for sit to stand, min A for ambulation with SPC, and supervision for ambulation with RW  Significant antalgic LOB noted with SPC  (Please find full objective findings from PT assessment regarding body systems outlined above)  Impairments and limitations also listed above, especially due to  weakness, impaired balance, decreased endurance, gait deviations, pain, decreased activity tolerance, decreased safety awareness and fall risk  Pt's clinical presentation is currently unstable/unpredictable seen in pt's presentation of fall risk, pain with mobility, and limited insight into deficits  Pt to benefit from continued skilled PT tx while in hospital and upon DC to address deficits as defined above and maximize level of functional mobility  Recommend progression of ambulation and initiation of HEP as appropriate  Goals   Patient Goals to go home   STG Expiration Date 07/01/23   Short Term Goal #1 Pt will be able to: (1) perform bed mobility with mod I to decrease caregiver burden (2) perform sit to stand with mod I to increase level of independence and promote safe toiletting and transfers (3) ambulate at least 200` with mod I and least restrictive AD to increase activity tolerance and allow for safe community mobilization (4) increase standing balance by 1 grade to decrease risk of falls (5) negotiate at least 3 stairs with mod I to allow safe access into home   PT Treatment Day 1   Plan   Treatment/Interventions Functional transfer training;LE strengthening/ROM; Therapeutic exercise; Endurance training;Patient/family training;Equipment eval/education; Bed mobility;Gait training;Elevations   PT Frequency 3-5x/wk   Recommendation   UB Rehab Discharge Recommendation (PT/OT) Level 4   Equipment Recommended [facility] Recommended Wheeled walker   AM-PAC Basic Mobility Inpatient   Turning in Flat Bed Without Bedrails 4   Lying on Back to Sitting on Edge of Flat Bed Without Bedrails 4   Moving Bed to Chair 4   Standing Up From Chair Using Arms 3   Walk in Room 3   Climb 3-5 Stairs With Tiffanie 3   Basic Mobility Inpatient Raw Score 21   Basic Mobility Standardized Score 45 55   Highest Level Of Mobility   -HLM Goal 6: Walk 10 steps or more   JH-HLM Achieved 7: Walk 25 feet or more   Additional Treatment Session   Start Time 1415   End Time 1425   Treatment Assessment Pt agrees to PT treatment and is pleasant and cooperative throughout session  Able to ambulate an additional ~70` x1 with mod I and RW  Education provided on use and precautions of RW  No LOB noted with use of RW  Will continue to benefit from ongoing skilled PT to maximize his functional mobility and increase his level of independence  Equipment Use RW   Additional Treatment Day 1   End of Consult   Patient Position at End of Consult Seated edge of bed; All needs within reach   Pt was seen for a co-eval with OT due to potential need for significant physical assist, poor pain control, impaired mental status, limiting behaviors, and poor adherence to precautions  The patient's [facility] Basic Mobility Inpatient Short Form Raw Score is 21, Standardized Score is 45 55  A Raw Score of greater than or equal to 16 suggests the patient may benefit from discharge to home  However please refer to therapist recommendation for discharge planning given other factors that may influence destination  Adapted from [facility] [place] [facility] “6-Clicks” Basic Mobility and Daily Activity Scores With Discharge Destination  Physical Therapy, 2021;101:1-9   DOI: 10 [email or web address]      Maryse Quezada, PT,DPT

## 2023-06-21 NOTE — CASE MANAGEMENT
Case Management Discharge Planning Note    Patient name Ld Garcia  Location /-83 MRN [id number]  : 1965 Date 2023       Current Admission Date: 2023  Current Admission Diagnosis:Swelling of right foot   Patient Active Problem List    Diagnosis Date Noted   • Swelling of right foot 2023   • Hearing impaired 2023   • Peripheral neuropathy 2023   • Alcohol use disorder, severe, dependence ([facility] [address] ) 2023   • Macrocytosis without anemia 2023      LOS (days): 2  Geometric Mean LOS (GMLOS) (days):   Days to GMLOS:     OBJECTIVE:  Risk of Unplanned Readmission Score: 9 72         Current admission status: Inpatient   Preferred Pharmacy:   PATIENT/FAMILY REPORTS NO PREFERRED PHARMACY  No address on file      Primary Care Provider: No primary care provider on file  Primary Insurance: ANUSHA FARR PENDING  Secondary Insurance:     DISCHARGE DETAILS:        Spoke with Wili Amanda at [facility] and explained that pt will need to stay one more night to be monitored off ABX  Ptis independent and uses a walker at night  Wili Amanda stated that they can accept him back with walker

## 2023-06-21 NOTE — PLAN OF CARE
Problem: PHYSICAL THERAPY ADULT  Goal: Performs mobility at highest level of function for planned discharge setting  See evaluation for individualized goals  Description: Treatment/Interventions: Functional transfer training, LE strengthening/ROM, Therapeutic exercise, Endurance training, Patient/family training, Equipment eval/education, Bed mobility, Gait training, Elevations  Equipment Recommended: Walker       See flowsheet documentation for full assessment, interventions and recommendations  Note:    Problem List: Decreased strength, Decreased endurance, Impaired balance, Decreased mobility, Decreased safety awareness, Pain  Assessment: Pt is a 62 y o  male seen for PT evaluation s/p admit to [facility] on 8/18/2022 w/ Swelling of right foot  Order placed for PT  Comorbidities affecting pt's physical performance at time of assessment include: neuropathy  Personal factors affecting pt at time of IE include: steps to enter environment, limited home support and limited insight into impairments  Prior to admission, pt was was independent w/ all functional mobility w/ SPC, lived in one floor environment, had 3 ROS[address] MARIA (+) railing and lived with roommates  Upon evaluation: Pt requires supervision for sit to stand, min A for ambulation with SPC, and supervision for ambulation with RW  Significant antalgic LOB noted with SPC  (Please find full objective findings from PT assessment regarding body systems outlined above)  Impairments and limitations also listed above, especially due to  weakness, impaired balance, decreased endurance, gait deviations, pain, decreased activity tolerance, decreased safety awareness and fall risk  Pt's clinical presentation is currently unstable/unpredictable seen in pt's presentation of fall risk, pain with mobility, and limited insight into deficits    Pt to benefit from continued skilled PT tx while in hospital and upon DC to address deficits as defined above and maximize level of functional mobility  Recommend progression of ambulation and initiation of HEP as appropriate  See flowsheet documentation for full assessment

## 2023-06-21 NOTE — PLAN OF CARE
Problem: PAIN - ADULT  Goal: Verbalizes/displays adequate comfort level or baseline comfort level  Description: Interventions:  - Encourage patient to monitor pain and request assistance  - Assess pain using appropriate pain scale  - Administer analgesics based on type and severity of pain and evaluate response  - Implement non-pharmacological measures as appropriate and evaluate response  - Consider cultural and social influences on pain and pain management  - Notify physician/advanced practitioner if interventions unsuccessful or patient reports new pain  Outcome: Progressing     Problem: INFECTION - ADULT  Goal: Absence or prevention of progression during hospitalization  Description: INTERVENTIONS:  - Assess and monitor for signs and symptoms of infection  - Monitor lab/diagnostic results  - Monitor all insertion sites, i e  indwelling lines, tubes, and drains  - Monitor endotracheal if appropriate and nasal secretions for changes in amount and color  - [place] appropriate cooling/warming therapies per order  - Administer medications as ordered  - Instruct and encourage patient and family to use good hand hygiene technique  - Identify and instruct in appropriate isolation precautions for identified infection/condition  Outcome: Progressing     Problem: SAFETY ADULT  Goal: Patient will remain free of falls  Description: INTERVENTIONS:  - Educate patient/family on patient safety including physical limitations  - Instruct patient to call for assistance with activity   - Consult OT/PT to assist with strengthening/mobility   - Keep Call bell within reach  - Keep bed low and locked with side rails adjusted as appropriate  - Keep care items and personal belongings within reach  - Initiate and maintain comfort rounds  - Make Fall Risk Sign visible to staff  - Offer Toileting every  Hours, in advance of need  - Initiate/Maintain alarm  - Obtain necessary fall risk management equipment:   - Apply yellow socks and bracelet for high fall risk patients  - Consider moving patient to room near nurses station  Outcome: Progressing  Goal: Maintain or return to baseline ADL function  Description: INTERVENTIONS:  -  Assess patient's ability to carry out ADLs; assess patient's baseline for ADL function and identify physical deficits which impact ability to perform ADLs (bathing, care of mouth/teeth, toileting, grooming, dressing, etc )  - Assess/evaluate cause of self-care deficits   - Assess range of motion  - Assess patient's mobility; develop plan if impaired  - Assess patient's need for assistive devices and provide as appropriate  - Encourage maximum independence but intervene and supervise when necessary  - Involve family in performance of ADLs  - Assess for home care needs following discharge   - Consider OT consult to assist with ADL evaluation and planning for discharge  - Provide patient education as appropriate  Outcome: Progressing  Goal: Maintains/Returns to pre admission functional level  Description: INTERVENTIONS:  - Perform BMAT or MOVE assessment daily    - Set and communicate daily mobility goal to care team and patient/family/caregiver  - Collaborate with rehabilitation services on mobility goals if consulted  - Perform Range of Motion  times a day  - Reposition patient every  hours    - Dangle patient times a day  - Stand patient  times a day  - Ambulate patient  times a day  - Out of bed to chair  times a day   - Out of bed for meals  times a day  - Out of bed for toileting  - Record patient progress and toleration of activity level   Outcome: Progressing     Problem: DISCHARGE PLANNING  Goal: Discharge to home or other facility with appropriate resources  Description: INTERVENTIONS:  - Identify barriers to discharge w/patient and caregiver  - Arrange for needed discharge resources and transportation as appropriate  - Identify discharge learning needs (meds, wound care, etc )  - Arrange for interpretive services to assist at discharge as needed  - Refer to Case Management Department for coordinating discharge planning if the patient needs post-hospital services based on physician/advanced practitioner order or complex needs related to functional status, cognitive ability, or social support system  Outcome: Progressing     Problem: Knowledge Deficit  Goal: Patient/family/caregiver demonstrates understanding of disease process, treatment plan, medications, and discharge instructions  Description: Complete learning assessment and assess knowledge base  Interventions:  - Provide teaching at level of understanding  - Provide teaching via preferred learning methods  Outcome: Progressing     Problem: MOBILITY - ADULT  Goal: Maintain or return to baseline ADL function  Description: INTERVENTIONS:  -  Assess patient's ability to carry out ADLs; assess patient's baseline for ADL function and identify physical deficits which impact ability to perform ADLs (bathing, care of mouth/teeth, toileting, grooming, dressing, etc )  - Assess/evaluate cause of self-care deficits   - Assess range of motion  - Assess patient's mobility; develop plan if impaired  - Assess patient's need for assistive devices and provide as appropriate  - Encourage maximum independence but intervene and supervise when necessary  - Involve family in performance of ADLs  - Assess for home care needs following discharge   - Consider OT consult to assist with ADL evaluation and planning for discharge  - Provide patient education as appropriate  Outcome: Progressing  Goal: Maintains/Returns to pre admission functional level  Description: INTERVENTIONS:  - Perform BMAT or MOVE assessment daily    - Set and communicate daily mobility goal to care team and patient/family/caregiver  - Collaborate with rehabilitation services on mobility goals if consulted  - Perform Range of Motion times a day  - Reposition patient every  hours    - Dangle patient  times a day  - Stand patient  times a day  - Ambulate patient  times a day  - Out of bed to chair  times a day   - Out of bed for meal times a day  - Out of bed for toileting  - Record patient progress and toleration of activity level   Outcome: Progressing

## 2023-06-21 NOTE — PLAN OF CARE
Problem: PAIN - ADULT  Goal: Verbalizes/displays adequate comfort level or baseline comfort level  Description: Interventions:  - Encourage patient to monitor pain and request assistance  - Assess pain using appropriate pain scale  - Administer analgesics based on type and severity of pain and evaluate response  - Implement non-pharmacological measures as appropriate and evaluate response  - Consider cultural and social influences on pain and pain management  - Notify physician/advanced practitioner if interventions unsuccessful or patient reports new pain  Outcome: Progressing     Problem: INFECTION - ADULT  Goal: Absence or prevention of progression during hospitalization  Description: INTERVENTIONS:  - Assess and monitor for signs and symptoms of infection  - Monitor lab/diagnostic results  - Monitor all insertion sites, i e  indwelling lines, tubes, and drains  - Monitor endotracheal if appropriate and nasal secretions for changes in amount and color  - [place] appropriate cooling/warming therapies per order  - Administer medications as ordered  - Instruct and encourage patient and family to use good hand hygiene technique  - Identify and instruct in appropriate isolation precautions for identified infection/condition  Outcome: Progressing     Problem: MOBILITY - ADULT  Goal: Maintain or return to baseline ADL function  Description: INTERVENTIONS:  -  Assess patient's ability to carry out ADLs; assess patient's baseline for ADL function and identify physical deficits which impact ability to perform ADLs (bathing, care of mouth/teeth, toileting, grooming, dressing, etc )  - Assess/evaluate cause of self-care deficits   - Assess range of motion  - Assess patient's mobility; develop plan if impaired  - Assess patient's need for assistive devices and provide as appropriate  - Encourage maximum independence but intervene and supervise when necessary  - Involve family in performance of ADLs  - Assess for home care needs following discharge   - Consider OT consult to assist with ADL evaluation and planning for discharge  - Provide patient education as appropriate  Outcome: Progressing

## 2023-06-22 VITALS
RESPIRATION RATE: 16 BRPM | HEART RATE: 77 BPM | HEIGHT: 68 IN | WEIGHT: 165 LBS | BODY MASS INDEX: 25.01 KG/M2 | OXYGEN SATURATION: 100 % | DIASTOLIC BLOOD PRESSURE: 89 MMHG | TEMPERATURE: 97.2 F | SYSTOLIC BLOOD PRESSURE: 133 MMHG

## 2023-06-22 LAB
ANION GAP SERPL CALCULATED.3IONS-SCNC: 4 MMOL/L
BACTERIA BLD CULT: NORMAL
BASOPHILS # BLD AUTO: 0.05 THOUSANDS/ÂΜL (ref 0–0.1)
BASOPHILS NFR BLD AUTO: 1 % (ref 0–1)
BUN SERPL-MCNC: 11 MG/DL (ref 5–25)
CALCIUM SERPL-MCNC: 9.3 MG/DL (ref 8.4–10.2)
CHLORIDE SERPL-SCNC: 105 MMOL/L (ref 96–108)
CO2 SERPL-SCNC: 29 MMOL/L (ref 21–32)
CREAT SERPL-MCNC: 0.76 MG/DL (ref 0.6–1.3)
EOSINOPHIL # BLD AUTO: 0.09 THOUSAND/ÂΜL (ref 0–0.61)
EOSINOPHIL NFR BLD AUTO: 1 % (ref 0–6)
ERYTHROCYTE [DISTWIDTH] IN BLOOD BY AUTOMATED COUNT: 11.2 % (ref 11.6–15.1)
GFR SERPL CREATININE-BSD FRML MDRD: 100 ML/MIN/1.73SQ M
GLUCOSE SERPL-MCNC: 86 MG/DL (ref 65–140)
HCT VFR BLD AUTO: 40.6 % (ref 36.5–49.3)
HGB BLD-MCNC: 13.5 G/DL (ref 12–17)
IMM GRANULOCYTES # BLD AUTO: 0.02 THOUSAND/UL (ref 0–0.2)
IMM GRANULOCYTES NFR BLD AUTO: 0 % (ref 0–2)
LYMPHOCYTES # BLD AUTO: 1.67 THOUSANDS/ÂΜL (ref 0.6–4.47)
LYMPHOCYTES NFR BLD AUTO: 24 % (ref 14–44)
MCH RBC QN AUTO: 33.7 PG (ref 26.8–34.3)
MCHC RBC AUTO-ENTMCNC: 33.3 G/DL (ref 31.4–37.4)
MCV RBC AUTO: 101 FL (ref 82–98)
MONOCYTES # BLD AUTO: 0.73 THOUSAND/ÂΜL (ref 0.17–1.22)
MONOCYTES NFR BLD AUTO: 10 % (ref 4–12)
NEUTROPHILS # BLD AUTO: 4.49 THOUSANDS/ÂΜL (ref 1.85–7.62)
NEUTS SEG NFR BLD AUTO: 64 % (ref 43–75)
NRBC BLD AUTO-RTO: 0 /100 WBCS
PLATELET # BLD AUTO: 338 THOUSANDS/UL (ref 149–390)
PMV BLD AUTO: 8.6 FL (ref 8.9–12.7)
POTASSIUM SERPL-SCNC: 3.9 MMOL/L (ref 3.5–5.3)
RBC # BLD AUTO: 4.01 MILLION/UL (ref 3.88–5.62)
SODIUM SERPL-SCNC: 138 MMOL/L (ref 135–147)
WBC # BLD AUTO: 7.05 THOUSAND/UL (ref 4.31–10.16)

## 2023-06-22 PROCEDURE — 85025 COMPLETE CBC W/AUTO DIFF WBC: CPT | Performed by: STUDENT IN AN ORGANIZED HEALTH CARE EDUCATION/TRAINING PROGRAM

## 2023-06-22 PROCEDURE — 87081 CULTURE SCREEN ONLY: CPT | Performed by: INTERNAL MEDICINE

## 2023-06-22 PROCEDURE — 99239 HOSP IP/OBS DSCHRG MGMT >30: CPT | Performed by: INTERNAL MEDICINE

## 2023-06-22 PROCEDURE — 80048 BASIC METABOLIC PNL TOTAL CA: CPT | Performed by: STUDENT IN AN ORGANIZED HEALTH CARE EDUCATION/TRAINING PROGRAM

## 2023-06-22 RX ORDER — ACETAMINOPHEN 325 MG/1
650 TABLET ORAL EVERY 6 HOURS PRN
Refills: 0
Start: 2023-06-22

## 2023-06-22 RX ORDER — PREDNISONE 10 MG/1
TABLET ORAL
Qty: 21 TABLET | Refills: 0
Start: 2023-06-22 | End: 2023-07-01

## 2023-06-22 RX ADMIN — ENOXAPARIN SODIUM 40 MG: 100 INJECTION SUBCUTANEOUS at 09:14

## 2023-06-22 RX ADMIN — MULTIPLE VITAMINS W/ MINERALS TAB 1 TABLET: TAB ORAL at 09:15

## 2023-06-22 RX ADMIN — THIAMINE HCL TAB 100 MG 100 MG: 100 TAB at 09:15

## 2023-06-22 RX ADMIN — PREDNISONE 40 MG: 20 TABLET ORAL at 09:15

## 2023-06-22 RX ADMIN — FOLIC ACID 1 MG: 1 TABLET ORAL at 09:15

## 2023-06-22 RX ADMIN — ACETAMINOPHEN 650 MG: 325 TABLET, FILM COATED ORAL at 07:35

## 2023-06-22 NOTE — DISCHARGE SUMMARY
[place]  Discharge- Daksha Yang 1965, 62 y o  male MRN: [id number]  Unit/Bed#: -01 Encounter: [id number]  Primary Care Provider: No primary care provider on file  Date and time admitted to hospital: 6/17/2023  9:19 AM    Peripheral neuropathy  Assessment & Plan  · History of peripheral neuropathy in bilateral lower extremities  · Ambulates with cane at baseline  · Fall precautions    Alcohol use disorder, severe, dependence ([facility] [address] )  Assessment & Plan  · Notes recent alcohol abuse history upwards of 6 beers daily  · Was discharged from [address] [place] [address] on 6/14  · Denies recent alcohol use since discharge -had been discharged to inpatient rehab at [facility]  · Thiamine, folic acid    * Swelling of right foot  Assessment & Plan  · Presented to the emergency department due to right foot erythema, swelling and discomfort x2 days  · S/p arthrocentesis, fluid studies pending  · Does have significant alcohol use history - ? Gout however uric acid was normal  · Received allopurinol and colchicine in the ED  · Blood cultures growing coagulase negative staph  · Repeat blood cultures are negative to date  · Does not meet any SIRS criteria  · ESR 35 CRP 71 5  · Received IV doxycycline in the ED  · Continue with IV rocephin/vancomycin  · MRI completed and reviewed with podiatry  · Podiatry follow-up  · Patient started on prednisone 40 mg daily 6/19 per podiatry recommendation  · Trend WBC and fever curve  · Patient antibiotics were discontinued on 6/21 and patient has been afebrile    · Per podiatry no surgical intervention at this point  · Patient is cleared by PT and OT and will return back to [facility] rehab  · Podiatry patient is cleared for discharge on tapering dose of prednisone and follow-up with them as outpatient in 1 week      Hospital Course:     Daksha Yang is a 62 y o  male patient who originally presented to the hospital on   Admission Orders (From admission, onward)     Ordered        06/19/23 1104  Inpatient Admission  Once            06/17/23 1354  Place in Observation  Once                     due to right foot pain and swelling  Patient sent from [facility]  Patient is undergoing rehab at [facility]  Patient was admitted possible concern for septic arthritis  Patient underwent arthrocentesis  Patient was seen by podiatry and underwent MRI which showed Moderate osteoarthritis of first MTP joint with prominent subchondral cystic changes  There is associated small joint effusion with periarticular soft tissue edema  Correlate with fluid analysis if there is a clinical concern for crystalline or   inflammatory/septic arthritis  Ganglion cyst   Patient cultures were negative  Patient was started on prednisone as per podiatry recommendation  Patient symptoms improved and pain improved significantly after starting prednisone  Patient antibiotics were discontinued and the patient remained afebrile  Patient is going to be discharged on tapering dose of prednisone  Patient follow-up with podiatry in a week  Patient was seen by PT and  and will be discharged back to [facility] for drug rehab  On Exam-  Chest-bilateral air entry, clear to auscultation  Abdomen-soft, nontender  Heart-S1 S2 regular  Extremities-no pedal edema or calf tenderness  Neuro-alert awake oriented x3  No focal deficits    Please see above list of diagnoses and related plan for additional information  Follow-up with PCP and podiatry as outpatient    Condition at Discharge:  good      Discharge instructions/Information to patient and family:   See after visit summary for information provided to patient and family  Provisions for Follow-Up Care:  See after visit summary for information related to follow-up care and any pertinent home health orders        Disposition:     Other: [facility]       Discharge Statement:  I spent 40 minutes discharging the patient  This time was spent on the day of discharge  I had direct contact with the patient on the day of discharge  Greater than 50% of the total time was spent examining patient, answering all patient questions, arranging and discussing plan of care with patient as well as directly providing post-discharge instructions  Additional time then spent on discharge activities  Discharge Medications:  See after visit summary for reconciled discharge medications provided to patient and family        ** Please Note: This note has been constructed using a voice recognition system **

## 2023-06-22 NOTE — CASE MANAGEMENT
Case Management Discharge Planning Note    Patient name Maria Antonia Gavin  Location /-46 MRN [id number]  : 1965 Date 2023       Current Admission Date: 2023  Current Admission Diagnosis:Swelling of right foot   Patient Active Problem List    Diagnosis Date Noted   • Swelling of right foot 2023   • Hearing impaired 2023   • Peripheral neuropathy 2023   • Alcohol use disorder, severe, dependence ([facility] [address] ) 2023   • Macrocytosis without anemia 2023      LOS (days): 3  Geometric Mean LOS (GMLOS) (days):   Days to GMLOS:     OBJECTIVE:  Risk of Unplanned Readmission Score: 9 63         Current admission status: Inpatient   Preferred Pharmacy:   PATIENT/FAMILY REPORTS NO PREFERRED PHARMACY  No address on file      Primary Care Provider: No primary care provider on file  Primary Insurance: ANUSHA FARR PENDING  Secondary Insurance:     DISCHARGE DETAILS:           Per MD, patient is medically stable for discharge today  Call placed to [facility] and [address] [facility] with Kriss  She asked for a nurse report and then she will call CM back to coordinate transport  CM provided contact information  Received call back from Kriss and Kandy Ann from [facility] and was informed that the PT notes from yesterday indicated the need for continued services upon discharge and another note indicates he is independent with no needs  Requested that therapy contact Megan to clarify patient needs  Therapy and nurse gave report to Kriss at [facility]  Kriss called CM back and they can accept patient back today  They will pick him up around noon  MD and nurse notified via TT  Patient notified in person                                                                                           Receiving Facility/Agency Phone Number: [phone]

## 2023-06-22 NOTE — ASSESSMENT & PLAN NOTE
· Notes recent alcohol abuse history upwards of 6 beers daily  · Was discharged from [address] [place] [address] on 6/14  · Denies recent alcohol use since discharge -had been discharged to inpatient rehab at [place] [address]  · Thiamine, folic acid

## 2023-06-22 NOTE — ASSESSMENT & PLAN NOTE
· Presented to the emergency department due to right foot erythema, swelling and discomfort x2 days  · S/p arthrocentesis, fluid studies pending  · Does have significant alcohol use history - ? Gout however uric acid was normal  · Received allopurinol and colchicine in the ED  · Blood cultures growing coagulase negative staph  · Repeat blood cultures are negative to date  · Does not meet any SIRS criteria  · ESR 35 CRP 71 5  · Received IV doxycycline in the ED  · Continue with IV rocephin/vancomycin  · MRI completed and reviewed with podiatry  · Podiatry follow-up  · Patient started on prednisone 40 mg daily 6/19 per podiatry recommendation  · Trend WBC and fever curve  · Patient antibiotics were discontinued on 6/21 and patient has been afebrile    · Per podiatry no surgical intervention at this point  · Patient is cleared by PT and OT and will return back to [facility] rehab  · Podiatry patient is cleared for discharge on tapering dose of prednisone and follow-up with them as outpatient in 1 week

## 2023-06-23 LAB — MRSA NOSE QL CULT: NORMAL

## 2023-06-24 LAB
BACTERIA BLD CULT: NORMAL
BACTERIA BLD CULT: NORMAL

## 2023-07-05 ENCOUNTER — OFFICE VISIT (OUTPATIENT)
Dept: PODIATRY | Facility: CLINIC | Age: 58
End: 2023-07-05
Payer: COMMERCIAL

## 2023-07-05 VITALS
BODY MASS INDEX: 25.01 KG/M2 | HEIGHT: 68 IN | DIASTOLIC BLOOD PRESSURE: 80 MMHG | WEIGHT: 165 LBS | SYSTOLIC BLOOD PRESSURE: 123 MMHG | HEART RATE: 80 BPM

## 2023-07-05 DIAGNOSIS — M25.572 PAIN IN JOINTS OF BOTH FEET: ICD-10-CM

## 2023-07-05 DIAGNOSIS — M10.079 ACUTE IDIOPATHIC GOUT OF FOOT, UNSPECIFIED LATERALITY: ICD-10-CM

## 2023-07-05 DIAGNOSIS — M25.571 PAIN IN JOINTS OF BOTH FEET: ICD-10-CM

## 2023-07-05 DIAGNOSIS — M19.90 INFLAMMATORY ARTHROPATHY: Primary | ICD-10-CM

## 2023-07-05 PROCEDURE — 99214 OFFICE O/P EST MOD 30 MIN: CPT | Performed by: PODIATRIST

## 2023-07-05 RX ORDER — COLCHICINE 0.6 MG/1
0.6 TABLET ORAL 2 TIMES DAILY
Qty: 60 TABLET | Refills: 0 | Status: SHIPPED | OUTPATIENT
Start: 2023-07-05

## 2023-07-05 NOTE — PROGRESS NOTES
PATIENT:  Jessica Oar  1965       ASSESSMENT:     1. Inflammatory arthropathy  colchicine (COLCRYS) 0.6 mg tablet      2. Pain in joints of both feet        3. Acute idiopathic gout of foot, unspecified laterality  colchicine (COLCRYS) 0.6 mg tablet                PLAN:  1. Reviewed medical records. Reviewed podiatry notes from previous admission. Reviewed images including X-ray and MRI. Reviewed labs. Patient was counseled and educated on the condition and the diagnosis. 2. The diagnosis, treatment options and prognosis were discussed with the patient. 3. Low suspicion for any infection. Clinically, it is still suspicious for gout or pseudogout even though uric acid was normal.  4. Low purine diet for precaution. 5. Start him on Colchicine. Possible injection depending on the progress. 6. Patient will return in 2 weeks for re-evaluation. Imaging: I have personally reviewed pertinent films in PACS  Labs, pathology, and Other Studies: I have personally reviewed pertinent reports. Subjective:       HPI  The patient presents for chief complaint of bilateral foot pain. He had acute pain around great toe joints bilaterally. It started about 3 weeks ago. He was treated at [address] [facility] for acute joint pain with possible infection. Pain has been better. It is still about 5-8 out of 10. Still notices some swelling. He has history of alcohol abuse. He has not had alcohol for 3-4 weeks now. No history of diabetes. Denied any injury to his feet. The following portions of the patient's history were reviewed and updated as appropriate: allergies, current medications, past family history, past medical history, past social history, past surgical history and problem list.  All pertinent labs and images were reviewed.       Past Medical History  Past Medical History:   Diagnosis Date   • Alcohol withdrawal syndrome with complication ([facility]) 2/65/6281       Past Surgical History  History reviewed. No pertinent surgical history. Allergies:  Penicillins and Sulfa antibiotics    Medications:  Current Outpatient Medications   Medication Sig Dispense Refill   • acetaminophen (TYLENOL) 325 mg tablet Take 2 tablets (650 mg total) by mouth every 6 (six) hours as needed for mild pain, headaches or fever  0   • colchicine (COLCRYS) 0.6 mg tablet Take 1 tablet (0.6 mg total) by mouth 2 (two) times a day With meal. 60 tablet 0     No current facility-administered medications for this visit. Social History:  Social History     Socioeconomic History   • Marital status:      Spouse name: None   • Number of children: None   • Years of education: None   • Highest education level: None   Occupational History   • None   Tobacco Use   • Smoking status: Never   • Smokeless tobacco: Never   Vaping Use   • Vaping Use: Never used   Substance and Sexual Activity   • Alcohol use: Yes     Alcohol/week: 42.0 standard drinks of alcohol     Types: 42 Cans of beer per week     Comment: drinks 6 pack or IPA (7% alcohol each 12 oz can). And maybe a pint of beer in the bar   • Drug use: Never   • Sexual activity: Not Currently   Other Topics Concern   • None   Social History Narrative   • None     Social Determinants of Health     Financial Resource Strain: Not on file   Food Insecurity: No Food Insecurity (6/19/2023)    Hunger Vital Sign    • Worried About Running Out of Food in the Last Year: Never true    • Ran Out of Food in the Last Year: Never true   Transportation Needs: No Transportation Needs (6/19/2023)    PRAPARE - Transportation    • Lack of Transportation (Medical): No    • Lack of Transportation (Non-Medical):  No   Physical Activity: Not on file   Stress: Not on file   Social Connections: Not on file   Intimate Partner Violence: Not on file   Housing Stability: Low Risk  (6/19/2023)    Housing Stability Vital Sign    • Unable to Pay for Housing in the Last Year: No    • Number of Places Lived in the Last Year: 1    • Unstable Housing in the Last Year: No          Review of Systems   Constitutional: Negative for chills and fever. Respiratory: Negative for cough and shortness of breath. Cardiovascular: Negative for chest pain. Gastrointestinal: Negative for nausea and vomiting. Musculoskeletal: Positive for gait problem and joint swelling. Skin: Negative for wound. Neurological: Negative for weakness and numbness. Psychiatric/Behavioral: Negative for confusion. Objective:      /80   Pulse 80   Ht 5' 8" (1.727 m)   Wt 74.8 kg (165 lb)   BMI 25.09 kg/m²          Physical Exam  Vitals reviewed. Constitutional:       General: He is not in acute distress. Appearance: He is not toxic-appearing or diaphoretic. HENT:      Head: Normocephalic and atraumatic. Eyes:      Extraocular Movements: Extraocular movements intact. Cardiovascular:      Rate and Rhythm: Normal rate and regular rhythm. Pulses: Normal pulses. Dorsalis pedis pulses are 2+ on the right side and 2+ on the left side. Posterior tibial pulses are 2+ on the right side and 2+ on the left side. Pulmonary:      Effort: Pulmonary effort is normal. No respiratory distress. Musculoskeletal:         General: Swelling and tenderness present. Cervical back: Normal range of motion and neck supple. Right lower leg: No edema. Left lower leg: No edema. Right foot: No foot drop. Left foot: No foot drop. Comments: Pain and mild redness/ warmth noted bilateral 1st MPJ, right worse than left. Decreased ROM bilateral 1st MPJ with pain. Skin:     General: Skin is warm. Capillary Refill: Capillary refill takes less than 2 seconds. Coloration: Skin is not cyanotic or mottled. Findings: No abscess, ecchymosis or wound. Nails: There is no clubbing. Neurological:      General: No focal deficit present.       Mental Status: He is alert and oriented to person, place, and time. Cranial Nerves: No cranial nerve deficit. Sensory: No sensory deficit. Motor: No weakness. Coordination: Coordination normal.   Psychiatric:         Mood and Affect: Mood normal.         Behavior: Behavior normal.         Thought Content:  Thought content normal.         Judgment: Judgment normal.

## 2023-07-19 ENCOUNTER — OFFICE VISIT (OUTPATIENT)
Dept: PODIATRY | Facility: CLINIC | Age: 58
End: 2023-07-19
Payer: COMMERCIAL

## 2023-07-19 VITALS
WEIGHT: 165 LBS | HEART RATE: 95 BPM | DIASTOLIC BLOOD PRESSURE: 91 MMHG | BODY MASS INDEX: 25.01 KG/M2 | HEIGHT: 68 IN | SYSTOLIC BLOOD PRESSURE: 122 MMHG

## 2023-07-19 DIAGNOSIS — M19.90 INFLAMMATORY ARTHROPATHY: ICD-10-CM

## 2023-07-19 DIAGNOSIS — M10.071 ACUTE IDIOPATHIC GOUT OF RIGHT FOOT: Primary | ICD-10-CM

## 2023-07-19 DIAGNOSIS — M10.072 ACUTE IDIOPATHIC GOUT INVOLVING TOE OF LEFT FOOT: ICD-10-CM

## 2023-07-19 PROCEDURE — 20600 DRAIN/INJ JOINT/BURSA W/O US: CPT | Performed by: PODIATRIST

## 2023-07-19 PROCEDURE — 99213 OFFICE O/P EST LOW 20 MIN: CPT | Performed by: PODIATRIST

## 2023-07-19 RX ORDER — ROPIVACAINE HYDROCHLORIDE 5 MG/ML
10 INJECTION, SOLUTION EPIDURAL; INFILTRATION; PERINEURAL
Status: SHIPPED | OUTPATIENT
Start: 2023-07-19

## 2023-07-19 RX ORDER — TRIAMCINOLONE ACETONIDE 40 MG/ML
20 INJECTION, SUSPENSION INTRA-ARTICULAR; INTRAMUSCULAR
Status: SHIPPED | OUTPATIENT
Start: 2023-07-19

## 2023-07-19 RX ADMIN — ROPIVACAINE HYDROCHLORIDE 10 ML: 5 INJECTION, SOLUTION EPIDURAL; INFILTRATION; PERINEURAL at 08:30

## 2023-07-19 RX ADMIN — TRIAMCINOLONE ACETONIDE 20 MG: 40 INJECTION, SUSPENSION INTRA-ARTICULAR; INTRAMUSCULAR at 08:30

## 2023-07-19 NOTE — PROGRESS NOTES
PATIENT:  Elías Miller  1965       ASSESSMENT:     1. Acute idiopathic gout of right foot  Small joint arthrocentesis: bilateral great MTP      2. Acute idiopathic gout involving toe of left foot  Small joint arthrocentesis: bilateral great MTP      3. Inflammatory arthropathy                  PLAN:  1. Reviewed medical records. Patient was counseled and educated on the condition and the diagnosis. 2. The diagnosis, treatment options and prognosis were discussed with the patient. 3. Treatment options were discussed and the patient wished to proceed with an injection. See procedure. 4. Resting and icing. Low purine diet for precaution. D/C colchicine if his pain starts to feel better. 5. Patient will return in 2 weeks for re-evaluation. Imaging: I have personally reviewed pertinent films in PACS  Labs, pathology, and Other Studies: I have personally reviewed pertinent reports. Small joint arthrocentesis: bilateral great MTP  Universal Protocol:  Consent: Verbal consent obtained. Risks and benefits: risks, benefits and alternatives were discussed  Consent given by: patient  Time out: Immediately prior to procedure a "time out" was called to verify the correct patient, procedure, equipment, support staff and site/side marked as required.   Timeout called at: 7/19/2023 9:05 AM.  Patient understanding: patient states understanding of the procedure being performed  Site marked: the operative site was marked  Patient identity confirmed: verbally with patient    Supporting Documentation  Indications: pain   Procedure Details  Location: great toe - bilateral great MTP  Needle size: 25 G  Ultrasound guidance: no  Approach: dorsal    Medications (Right): 10 mL ropivacaine 0.5 %; 20 mg triamcinolone acetonide 40 mg/mLMedications (Left): 10 mL ropivacaine 0.5 %; 20 mg triamcinolone acetonide 40 mg/mL   Patient tolerance: patient tolerated the procedure well with no immediate complications            Subjective:       HPI  The patient presents for follow-up on bilateral foot pain. Pain is little better with med. Pain can be still significant. It can be 7 out of 10. No new injury. The following portions of the patient's history were reviewed and updated as appropriate: allergies, current medications, past family history, past medical history, past social history, past surgical history and problem list.  All pertinent labs and images were reviewed. Past Medical History  Past Medical History:   Diagnosis Date   • Alcohol withdrawal syndrome with complication ([address]) 7/17/8041       Past Surgical History  No past surgical history on file. Allergies:  Penicillins and Sulfa antibiotics    Medications:  Current Outpatient Medications   Medication Sig Dispense Refill   • acetaminophen (TYLENOL) 325 mg tablet Take 2 tablets (650 mg total) by mouth every 6 (six) hours as needed for mild pain, headaches or fever  0   • colchicine (COLCRYS) 0.6 mg tablet Take 1 tablet (0.6 mg total) by mouth 2 (two) times a day With meal. 60 tablet 0     No current facility-administered medications for this visit. Social History:  Social History     Socioeconomic History   • Marital status:      Spouse name: Not on file   • Number of children: Not on file   • Years of education: Not on file   • Highest education level: Not on file   Occupational History   • Not on file   Tobacco Use   • Smoking status: Never   • Smokeless tobacco: Never   Vaping Use   • Vaping Use: Never used   Substance and Sexual Activity   • Alcohol use: Yes     Alcohol/week: 42.0 standard drinks of alcohol     Types: 42 Cans of beer per week     Comment: drinks 6 pack or IPA (7% alcohol each 12 oz can).  And maybe a pint of beer in the bar   • Drug use: Never   • Sexual activity: Not Currently   Other Topics Concern   • Not on file   Social History Narrative   • Not on file     Social Determinants of Health Financial Resource Strain: Not on file   Food Insecurity: No Food Insecurity (6/19/2023)    Hunger Vital Sign    • Worried About Running Out of Food in the Last Year: Never true    • Ran Out of Food in the Last Year: Never true   Transportation Needs: No Transportation Needs (6/19/2023)    PRAPARE - Transportation    • Lack of Transportation (Medical): No    • Lack of Transportation (Non-Medical): No   Physical Activity: Not on file   Stress: Not on file   Social Connections: Not on file   Intimate Partner Violence: Not on file   Housing Stability: Low Risk  (6/19/2023)    Housing Stability Vital Sign    • Unable to Pay for Housing in the Last Year: No    • Number of Places Lived in the Last Year: 1    • Unstable Housing in the Last Year: No          Review of Systems   Constitutional: Negative for chills and fever. Respiratory: Negative for cough and shortness of breath. Cardiovascular: Negative for chest pain. Gastrointestinal: Negative for nausea and vomiting. Musculoskeletal: Positive for gait problem and joint swelling. Skin: Negative for wound. Neurological: Negative for weakness and numbness. Psychiatric/Behavioral: Negative for confusion. Objective:      /91   Pulse 95   Ht 5' 8" (1.727 m)   Wt 74.8 kg (165 lb)   BMI 25.09 kg/m²          Physical Exam  Vitals reviewed. Constitutional:       General: He is not in acute distress. Appearance: He is not toxic-appearing or diaphoretic. Cardiovascular:      Rate and Rhythm: Normal rate and regular rhythm. Pulses: Normal pulses. Dorsalis pedis pulses are 2+ on the right side and 2+ on the left side. Posterior tibial pulses are 2+ on the right side and 2+ on the left side. Pulmonary:      Effort: Pulmonary effort is normal. No respiratory distress. Musculoskeletal:         General: Swelling and tenderness present. Right lower leg: No edema. Left lower leg: No edema.       Right foot: No foot drop. Left foot: No foot drop. Comments: Pain and mild redness/ warmth/ swelling noted bilateral 1st MPJ. Decreased ROM bilateral 1st MPJ. Skin:     General: Skin is warm. Capillary Refill: Capillary refill takes less than 2 seconds. Coloration: Skin is not cyanotic or mottled. Findings: No abscess, ecchymosis or wound. Nails: There is no clubbing. Neurological:      General: No focal deficit present. Mental Status: He is alert and oriented to person, place, and time. Cranial Nerves: No cranial nerve deficit. Sensory: No sensory deficit. Motor: No weakness. Coordination: Coordination normal.   Psychiatric:         Mood and Affect: Mood normal.         Behavior: Behavior normal.         Thought Content:  Thought content normal.         Judgment: Judgment normal.

## 2024-05-20 NOTE — ASSESSMENT & PLAN NOTE
· Presented to the emergency department due to right foot erythema, swelling and discomfort x2 days  · S/p arthrocentesis, fluid studies pending  · Does have significant alcohol use history - ?  Gout however uric acid was normal  · Received allopurinol and colchicine in the ED  · Lyme titer, rheumatoid factor obtained in the ED and pending  · Blood cultures pending x2  · Does not meet any SIRS criteria  · ESR 35 CRP 71 5  · Received IV doxycycline in the ED  · Will continue with IV rocephin/vancomycin for now pending fluid studies  · MRI completed - read pending  · Trend WBC and fever curve  · Consult podiatry 20-Nov-2023